# Patient Record
Sex: FEMALE | Race: BLACK OR AFRICAN AMERICAN | NOT HISPANIC OR LATINO | Employment: FULL TIME | URBAN - METROPOLITAN AREA
[De-identification: names, ages, dates, MRNs, and addresses within clinical notes are randomized per-mention and may not be internally consistent; named-entity substitution may affect disease eponyms.]

---

## 2017-01-16 ENCOUNTER — TELEPHONE (OUTPATIENT)
Dept: OBSTETRICS AND GYNECOLOGY | Facility: CLINIC | Age: 52
End: 2017-01-16

## 2017-01-16 NOTE — TELEPHONE ENCOUNTER
----- Message from Jill Evangelista sent at 1/16/2017 11:21 AM CST -----  Contact: self  Pt is calling in to speak with Dr Caceres.  She had a hysterectomy in 2015 and is not gaining weight since the surgery.  She would like to know if it may be a hormone imbalance.    Pt can be reached at [phone]

## 2017-01-17 NOTE — TELEPHONE ENCOUNTER
Spoke to patient and informed her Dr. Caceres would like her to follow up with Dr. Aden. Patient verbalized understanding.

## 2017-01-18 ENCOUNTER — TELEPHONE (OUTPATIENT)
Dept: OBSTETRICS AND GYNECOLOGY | Facility: CLINIC | Age: 52
End: 2017-01-18

## 2017-01-18 NOTE — TELEPHONE ENCOUNTER
----- Message from Desirae Cornell sent at 1/17/2017  4:05 PM CST -----  Contact: Patient  x 1st Request  _ 2nd Request  _ 3rd Request    Who: WALLY LOCK [[id number]]    Why: Patient is calling in regards to her RX for her patches for her hormones. She states the dosage is making her feel ill. Patient is needing a call back from staff.    What Number to Call Back: Patient can be reached at [phone].    When to Expect a call back: (Before the end of the day)  -- if call after 3:00 call back will be tomorrow.

## 2017-03-29 ENCOUNTER — OFFICE VISIT (OUTPATIENT)
Dept: INTERNAL MEDICINE | Facility: CLINIC | Age: 52
End: 2017-03-29
Attending: FAMILY MEDICINE
Payer: COMMERCIAL

## 2017-03-29 VITALS
HEART RATE: 87 BPM | WEIGHT: 237.19 LBS | SYSTOLIC BLOOD PRESSURE: 220 MMHG | DIASTOLIC BLOOD PRESSURE: 122 MMHG | HEIGHT: 67 IN | BODY MASS INDEX: 37.23 KG/M2

## 2017-03-29 DIAGNOSIS — Z90.79 STATUS POST TOTAL ABDOMINAL HYSTERECTOMY AND BILATERAL SALPINGO-OOPHORECTOMY: ICD-10-CM

## 2017-03-29 DIAGNOSIS — Z00.00 PREVENTATIVE HEALTH CARE: Primary | ICD-10-CM

## 2017-03-29 DIAGNOSIS — Z90.722 STATUS POST TOTAL ABDOMINAL HYSTERECTOMY AND BILATERAL SALPINGO-OOPHORECTOMY: ICD-10-CM

## 2017-03-29 DIAGNOSIS — I10 ESSENTIAL HYPERTENSION: ICD-10-CM

## 2017-03-29 DIAGNOSIS — Z90.710 STATUS POST TOTAL ABDOMINAL HYSTERECTOMY AND BILATERAL SALPINGO-OOPHORECTOMY: ICD-10-CM

## 2017-03-29 DIAGNOSIS — D50.0 IRON DEFICIENCY ANEMIA DUE TO CHRONIC BLOOD LOSS: ICD-10-CM

## 2017-03-29 DIAGNOSIS — Z79.890 HORMONE REPLACEMENT THERAPY (HRT): ICD-10-CM

## 2017-03-29 PROCEDURE — 99999 PR PBB SHADOW E&M-EST. PATIENT-LVL III: CPT | Mod: PBBFAC,,, | Performed by: FAMILY MEDICINE

## 2017-03-29 PROCEDURE — 99396 PREV VISIT EST AGE 40-64: CPT | Mod: S$GLB,,, | Performed by: FAMILY MEDICINE

## 2017-03-29 PROCEDURE — 3080F DIAST BP >= 90 MM HG: CPT | Mod: S$GLB,,, | Performed by: FAMILY MEDICINE

## 2017-03-29 PROCEDURE — 3077F SYST BP >= 140 MM HG: CPT | Mod: S$GLB,,, | Performed by: FAMILY MEDICINE

## 2017-03-29 RX ORDER — MELOXICAM 15 MG/1
15 TABLET ORAL DAILY
Qty: 30 TABLET | Refills: 3 | Status: SHIPPED | OUTPATIENT
Start: 2017-03-29 | End: 2018-03-29

## 2017-03-29 RX ORDER — ESTRADIOL 0.1 MG/D
1 PATCH TRANSDERMAL
COMMUNITY
End: 2017-04-27

## 2017-03-29 RX ORDER — LISINOPRIL AND HYDROCHLOROTHIAZIDE 20; 25 MG/1; MG/1
1 TABLET ORAL DAILY
Qty: 30 TABLET | Refills: 11 | Status: SHIPPED | OUTPATIENT
Start: 2017-03-29 | End: 2018-03-29

## 2017-03-29 NOTE — MR AVS SNAPSHOT
[facility]  [address]  [facility] [address]  Phone: [phone]  Fax: [phone]                  Tahira Jenkins   3/29/2017 3:20 PM   Office Visit    Description:  Female : 1965   Provider:  Arthur Smiley MD   Department:  [facility]           Reason for Visit     Annual Exam           Diagnoses this Visit        Comments    Preventative health care    -  Primary     Essential hypertension         Iron deficiency anemia due to chronic blood loss         Status post total abdominal hysterectomy and bilateral salpingo-oophorectomy         Hormone replacement therapy (HRT)                To Do List           Future Appointments        Provider Department Dept Phone    3/30/2017 7:00 AM LAB, SAME DAY [facility] [phone]    2017 3:30 PM Jim Waters NP [facility] [phone]    2017 3:00 PM Lizz Aden MD [facility] [address] [phone]      Goals (5 Years of Data)     None      Follow-Up and Disposition     Return in about 4 weeks (around 2017).       These Medications        Disp Refills Start End    meloxicam (MOBIC) 15 MG tablet 30 tablet 3 3/29/2017     Take 1 tablet (15 mg total) by mouth once daily. - Oral    Pharmacy: [facility] [address] [place] [address] [address] Ph #: [phone]       lisinopril-hydrochlorothiazide (PRINZIDE,ZESTORETIC) 20-25 mg Tab 30 tablet 11 3/29/2017 2017    Take 1 tablet by mouth once daily. - Oral    [facility] [address] [place] [address] [address] Ph #: [phone]         [facility]     [facility] -  Assistance  Registered nurses in the [facility] provide clinical advisement, health education, appointment booking, and other advisory services.  Call for this free service at [phone].             Medications           START  "taking these NEW medications        Refills    meloxicam (MOBIC) 15 MG tablet 3    Sig: Take 1 tablet (15 mg total) by mouth once daily.    Class: Normal    Route: Oral    lisinopril-hydrochlorothiazide (PRINZIDE,ZESTORETIC) 20-25 mg Tab 11    Sig: Take 1 tablet by mouth once daily.    Class: Normal    Route: Oral           Verify that the below list of medications is an accurate representation of the medications you are currently taking.  If none reported, the list may be blank. If incorrect, please contact your healthcare provider. Carry this list with you in case of emergency.           Current Medications     b complex vitamins capsule Take 1 capsule by mouth once daily.    estradiol 0.1 mg/24 hr td ptwk 0.1 mg/24 hr PTWK Place 1 patch onto the skin twice a week.    ferrous gluconate (FERGON) 324 MG tablet Take 324 mg by mouth daily with breakfast.    ferrous sulfate 300 mg (60 mg iron)/5 mL syrup Take 5 mLs (300 mg total) by mouth once daily.    multivitamin capsule Take 1 capsule by mouth once daily.    lisinopril-hydrochlorothiazide (PRINZIDE,ZESTORETIC) 20-25 mg Tab Take 1 tablet by mouth once daily.    meloxicam (MOBIC) 15 MG tablet Take 1 tablet (15 mg total) by mouth once daily.           Clinical Reference Information           Your Vitals Were     BP Pulse Height Weight Last Period BMI    220/122 (BP Location: Left arm, Patient Position: Sitting, BP Method: Automatic) 87 5' 7" (1.702 m) 107.6 kg (237 lb 3.4 oz) 11/23/2015 37.15 kg/m2      Blood Pressure          Most Recent Value    BP  (!)  220/122      Allergies as of 3/29/2017     Vivelle [Estradiol]      Immunizations Administered on Date of Encounter - 3/29/2017     None      Orders Placed During Today's Visit      Normal Orders This Visit    Ambulatory Referral to Gynecology     Future Labs/Procedures Expected by Expires    CBC auto differential  3/29/2017 6/27/2017    Comprehensive metabolic panel  3/29/2017 5/28/2017    Hemoglobin A1c  3/29/2017 " 6/27/2017    Lipid panel  3/29/2017 5/28/2017    TSH  3/29/2017 6/27/2017      Language Assistance Services     ATTENTION: Language assistance services are available, free of charge. Please call [phone].      ATENCIÓN: Si habla walter, tiene a morales disposición servicios gratuitos de asistencia lingüística. Llame al [phone].     CHÚ Ý: N?u b?n nói Ti?ng Vi?t, có các d?ch v? h? tr? ngôn ng? mi?n phí dành cho b?n. G?i s? [phone].         [facility] complies with applicable Federal civil rights laws and does not discriminate on the basis of race, color, national origin, age, disability, or sex.

## 2017-03-30 ENCOUNTER — LAB VISIT (OUTPATIENT)
Dept: LAB | Facility: OTHER | Age: 52
End: 2017-03-30
Attending: FAMILY MEDICINE
Payer: COMMERCIAL

## 2017-03-30 DIAGNOSIS — I10 ESSENTIAL HYPERTENSION: ICD-10-CM

## 2017-03-30 DIAGNOSIS — Z00.00 PREVENTATIVE HEALTH CARE: ICD-10-CM

## 2017-03-30 LAB
ALBUMIN SERPL BCP-MCNC: 3.9 G/DL
ALP SERPL-CCNC: 101 U/L
ALT SERPL W/O P-5'-P-CCNC: 16 U/L
ANION GAP SERPL CALC-SCNC: 12 MMOL/L
AST SERPL-CCNC: 17 U/L
BASOPHILS # BLD AUTO: 0.01 K/UL
BASOPHILS NFR BLD: 0.2 %
BILIRUB SERPL-MCNC: 0.5 MG/DL
BUN SERPL-MCNC: 12 MG/DL
CALCIUM SERPL-MCNC: 10.1 MG/DL
CHLORIDE SERPL-SCNC: 102 MMOL/L
CHOLEST/HDLC SERPL: 2.4 {RATIO}
CO2 SERPL-SCNC: 26 MMOL/L
CREAT SERPL-MCNC: 0.9 MG/DL
DIFFERENTIAL METHOD: ABNORMAL
EOSINOPHIL # BLD AUTO: 0.2 K/UL
EOSINOPHIL NFR BLD: 2.9 %
ERYTHROCYTE [DISTWIDTH] IN BLOOD BY AUTOMATED COUNT: 14.6 %
EST. GFR  (AFRICAN AMERICAN): >60 ML/MIN/1.73 M^2
EST. GFR  (NON AFRICAN AMERICAN): >60 ML/MIN/1.73 M^2
GLUCOSE SERPL-MCNC: 97 MG/DL
HCT VFR BLD AUTO: 47 %
HDL/CHOLESTEROL RATIO: 41.5 %
HDLC SERPL-MCNC: 159 MG/DL
HDLC SERPL-MCNC: 66 MG/DL
HGB BLD-MCNC: 15 G/DL
LDLC SERPL CALC-MCNC: 68.8 MG/DL
LYMPHOCYTES # BLD AUTO: 1.7 K/UL
LYMPHOCYTES NFR BLD: 25.8 %
MCH RBC QN AUTO: 25.7 PG
MCHC RBC AUTO-ENTMCNC: 31.9 %
MCV RBC AUTO: 81 FL
MONOCYTES # BLD AUTO: 0.5 K/UL
MONOCYTES NFR BLD: 7.7 %
NEUTROPHILS # BLD AUTO: 4.2 K/UL
NEUTROPHILS NFR BLD: 63.2 %
NONHDLC SERPL-MCNC: 93 MG/DL
PLATELET # BLD AUTO: 269 K/UL
PMV BLD AUTO: 9.6 FL
POTASSIUM SERPL-SCNC: 3.6 MMOL/L
PROT SERPL-MCNC: 7.6 G/DL
RBC # BLD AUTO: 5.83 M/UL
SODIUM SERPL-SCNC: 140 MMOL/L
TRIGL SERPL-MCNC: 121 MG/DL
TSH SERPL DL<=0.005 MIU/L-ACNC: 1.18 UIU/ML
WBC # BLD AUTO: 6.62 K/UL

## 2017-03-30 PROCEDURE — 80061 LIPID PANEL: CPT

## 2017-03-30 PROCEDURE — 36415 COLL VENOUS BLD VENIPUNCTURE: CPT

## 2017-03-30 PROCEDURE — 85025 COMPLETE CBC W/AUTO DIFF WBC: CPT

## 2017-03-30 PROCEDURE — 80053 COMPREHEN METABOLIC PANEL: CPT

## 2017-03-30 PROCEDURE — 84443 ASSAY THYROID STIM HORMONE: CPT

## 2017-03-30 PROCEDURE — 83036 HEMOGLOBIN GLYCOSYLATED A1C: CPT

## 2017-03-30 NOTE — PROGRESS NOTES
"CHIEF COMPLAINT: Annual exam    HISTORY OF PRESENT ILLNESS: The patient is a remarkably healthy 51-year-old black female.  The patient has not been seen in a while.  Her weight is up a good bit.  Her blood pressure is up a lot.  We will begin therapy for her blood pressure and have her see the nurse practitioner in one month.  I will also have her check in with her gynecologist regarding the hormone replacement therapy.  She is encouraged to begin lifestyle modification to help with both weight gain and hypertension    REVIEW OF SYSTEMS:  GENERAL: No fever, chills, fatigability or weight loss.  SKIN: No rashes, itching or changes in color or texture of skin.  HEAD: No headaches or recent head trauma.  EYES: Visual acuity fine. No photophobia, ocular pain or diplopia.  EARS: Denies ear pain, discharge or vertigo.  NOSE: No loss of smell, no epistaxis or postnasal drip.  MOUTH & THROAT: No hoarseness or change in voice. No excessive gum bleeding.  NODES: Denies swollen glands.  CHEST: Denies COSTA, cyanosis, wheezing, cough and sputum production.  CARDIOVASCULAR: Denies chest pain, PND, orthopnea or reduced exercise tolerance.  ABDOMEN: Appetite fine. No weight loss. Denies diarrhea, abdominal pain, hematemesis or blood in stool.  URINARY: No flank pain, dysuria or hematuria.  PERIPHERAL VASCULAR: No claudication or cyanosis.  MUSCULOSKELETAL: No joint stiffness or swelling. Denies back pain.  NEUROLOGIC: No history of seizures, paralysis, alteration of gait or coordination.    SOCIAL HISTORY: The patient does not smoke.  The patient consumes alcohol socially.  The patient works at [employer].    PHYSICAL EXAMINATION:     Blood pressure (!) 220/122, pulse 87, height 5' 7" (1.702 m), weight 107.6 kg (237 lb 3.4 oz), last menstrual period 11/23/2015.    APPEARANCE: Well nourished, well developed, in no acute distress.    HEAD: Normocephalic, atraumatic.  EYES: PERRL. EOMI.  Conjunctivae without injection and  " anicteric  EARS: TM's intact. Light reflex normal. No retraction or perforation.    NOSE: Mucosa pink. Airway clear.  MOUTH & THROAT: No tonsillar enlargement. No pharyngeal erythema or exudate. No stridor.  NECK: Supple.   NODES: No cervical, axillary or inguinal lymph node enlargement.  CHEST: Lungs clear to auscultation.  No retractions are noted.  No rales or rhonchi are present.  CARDIOVASCULAR: Normal S1, S2. No rubs, murmurs or gallops.  ABDOMEN: Bowel sounds normal. Not distended. Soft. No tenderness or masses.  No ascites is noted.  MUSCULOSKELETAL:  There is no clubbing, cyanosis, or edema of the extremities x4.  There is full range of motion of the lumbar spine.  There is full range of motion of the extremities x4.  There is no deformity noted.    NEUROLOGIC:       Normal speech development.      Hearing normal.      Normal gait.      Motor and sensory exams grossly normal.      DTR's normal.  PSYCHIATRIC: Patient is alert and oriented x3.  Thought processes are all normal.  There is no homicidality.  There is no suicidality.  There is no evidence of psychosis.    LABORATORY/RADIOLOGY:   Chart reviewed.  We will update blood work today.    ASSESSMENT:   Normal exam  Hypertension, poorly controlled  Significant weight gain  Hormone replacement therapy status post hysterectomy and oophorectomy  Fe def anemia, resolved    PLAN:  We will have the patient see GYN regarding the HRT  Prinzide  Lifestyle modification  We will follow-up blood work which we expect to be normal.    Return to clinic in one month with nurse practitioner for blood pressure management

## 2017-03-31 LAB
ESTIMATED AVG GLUCOSE: 85 MG/DL
HBA1C MFR BLD HPLC: 4.6 %

## 2017-04-27 ENCOUNTER — OFFICE VISIT (OUTPATIENT)
Dept: INTERNAL MEDICINE | Facility: CLINIC | Age: 52
End: 2017-04-27
Payer: COMMERCIAL

## 2017-04-27 VITALS
BODY MASS INDEX: 36.75 KG/M2 | DIASTOLIC BLOOD PRESSURE: 92 MMHG | SYSTOLIC BLOOD PRESSURE: 160 MMHG | HEART RATE: 88 BPM | HEIGHT: 67 IN | WEIGHT: 234.13 LBS

## 2017-04-27 DIAGNOSIS — I10 ESSENTIAL HYPERTENSION: Primary | ICD-10-CM

## 2017-04-27 PROCEDURE — 99213 OFFICE O/P EST LOW 20 MIN: CPT | Mod: S$GLB,,, | Performed by: NURSE PRACTITIONER

## 2017-04-27 PROCEDURE — 3080F DIAST BP >= 90 MM HG: CPT | Mod: S$GLB,,, | Performed by: NURSE PRACTITIONER

## 2017-04-27 PROCEDURE — 99999 PR PBB SHADOW E&M-EST. PATIENT-LVL III: CPT | Mod: PBBFAC,,, | Performed by: NURSE PRACTITIONER

## 2017-04-27 PROCEDURE — 1160F RVW MEDS BY RX/DR IN RCRD: CPT | Mod: S$GLB,,, | Performed by: NURSE PRACTITIONER

## 2017-04-27 PROCEDURE — 3077F SYST BP >= 140 MM HG: CPT | Mod: S$GLB,,, | Performed by: NURSE PRACTITIONER

## 2017-04-27 RX ORDER — AMLODIPINE BESYLATE 5 MG/1
5 TABLET ORAL DAILY
Qty: 30 TABLET | Refills: 0 | Status: SHIPPED | OUTPATIENT
Start: 2017-04-27 | End: 2017-05-25 | Stop reason: SDUPTHER

## 2017-04-27 NOTE — PROGRESS NOTES
Subjective:       Patient ID: Tahira Jenkins is a 51 y.o. female.    Chief Complaint: Hypertension    HPI Comments: Tahira Jenkins seen in follow-up for BP management.     Pt's BP is not well controlled but improving. Currently taking lisinopril/hctz 20/25 mg.  Tolerating meds well. Pt denies cp/sob/ha/vision or neuro changes. Not checking at home. Reports that BLE swelling has resolved since starting BP med.      Of note, pt seeing Dr. Aden 05/2 re: HRT.     Hypertension   This is a chronic problem. The problem is uncontrolled. Pertinent negatives include no anxiety, blurred vision, chest pain, headaches, malaise/fatigue, palpitations, peripheral edema or shortness of breath.     Review of Systems   Constitutional: Negative for activity change and malaise/fatigue.   HENT: Negative for facial swelling.    Eyes: Negative for blurred vision and photophobia.   Respiratory: Negative for chest tightness and shortness of breath.    Cardiovascular: Negative for chest pain, palpitations and leg swelling.   Neurological: Negative for dizziness, weakness and headaches.   Psychiatric/Behavioral: Negative for dysphoric mood.       Objective:      Physical Exam   Constitutional: She is oriented to person, place, and time. She appears well-developed and well-nourished.   HENT:   Head: Normocephalic and atraumatic.   Eyes: EOM are normal. Pupils are equal, round, and reactive to light.   Neck: Normal range of motion. Neck supple.   Cardiovascular: Normal rate, regular rhythm, normal heart sounds and intact distal pulses.    Pulmonary/Chest: Effort normal and breath sounds normal.   Musculoskeletal: Normal range of motion.   Neurological: She is alert and oriented to person, place, and time.   Skin: Skin is warm and dry.   Psychiatric: Her behavior is normal.   Vitals reviewed.      Assessment:       1. Essential hypertension        Plan:       -Start checking BP at home. Counseled on timing and technique. Keep log.    -Start amlodipine 5 mg daily. Side effects and proper use d/w pt. Continue lisinopril/HCTZ 20/25 mg daily.   -f/u with NP in 4 weeks for BP management  -f/u with Dr. Smiley in 6 months.

## 2017-05-02 ENCOUNTER — CLINICAL SUPPORT (OUTPATIENT)
Dept: OBSTETRICS AND GYNECOLOGY | Facility: CLINIC | Age: 52
End: 2017-05-02
Payer: COMMERCIAL

## 2017-05-02 ENCOUNTER — LAB VISIT (OUTPATIENT)
Dept: LAB | Facility: OTHER | Age: 52
End: 2017-05-02
Attending: OBSTETRICS & GYNECOLOGY
Payer: COMMERCIAL

## 2017-05-02 ENCOUNTER — OFFICE VISIT (OUTPATIENT)
Dept: OBSTETRICS AND GYNECOLOGY | Facility: CLINIC | Age: 52
End: 2017-05-02
Attending: OBSTETRICS & GYNECOLOGY
Payer: COMMERCIAL

## 2017-05-02 VITALS
WEIGHT: 226.19 LBS | SYSTOLIC BLOOD PRESSURE: 146 MMHG | DIASTOLIC BLOOD PRESSURE: 94 MMHG | BODY MASS INDEX: 35.5 KG/M2 | HEIGHT: 67 IN

## 2017-05-02 DIAGNOSIS — Z78.0 MENOPAUSE: ICD-10-CM

## 2017-05-02 DIAGNOSIS — Z12.31 VISIT FOR SCREENING MAMMOGRAM: Primary | ICD-10-CM

## 2017-05-02 DIAGNOSIS — R53.83 LOW ENERGY: ICD-10-CM

## 2017-05-02 DIAGNOSIS — R53.83 LOW ENERGY: Primary | ICD-10-CM

## 2017-05-02 LAB — ESTRADIOL SERPL-MCNC: 16 PG/ML

## 2017-05-02 PROCEDURE — 3077F SYST BP >= 140 MM HG: CPT | Mod: S$GLB,,, | Performed by: OBSTETRICS & GYNECOLOGY

## 2017-05-02 PROCEDURE — 99999 PR PBB SHADOW E&M-EST. PATIENT-LVL II: CPT | Mod: PBBFAC,,,

## 2017-05-02 PROCEDURE — 3080F DIAST BP >= 90 MM HG: CPT | Mod: S$GLB,,, | Performed by: OBSTETRICS & GYNECOLOGY

## 2017-05-02 PROCEDURE — 99214 OFFICE O/P EST MOD 30 MIN: CPT | Mod: 25,S$GLB,, | Performed by: OBSTETRICS & GYNECOLOGY

## 2017-05-02 PROCEDURE — 1160F RVW MEDS BY RX/DR IN RCRD: CPT | Mod: S$GLB,,, | Performed by: OBSTETRICS & GYNECOLOGY

## 2017-05-02 PROCEDURE — 99999 PR PBB SHADOW E&M-EST. PATIENT-LVL II: CPT | Mod: PBBFAC,,, | Performed by: OBSTETRICS & GYNECOLOGY

## 2017-05-02 PROCEDURE — 96372 THER/PROPH/DIAG INJ SC/IM: CPT | Mod: S$GLB,,, | Performed by: OBSTETRICS & GYNECOLOGY

## 2017-05-02 RX ORDER — ESTRADIOL 0.05 MG/D
1 FILM, EXTENDED RELEASE TRANSDERMAL WEEKLY
Qty: 12 PATCH | Refills: 3 | Status: SHIPPED | OUTPATIENT
Start: 2017-05-02 | End: 2017-06-19 | Stop reason: SDUPTHER

## 2017-05-02 RX ORDER — TESTOSTERONE CYPIONATE 200 MG/ML
50 INJECTION, SOLUTION INTRAMUSCULAR ONCE
Status: COMPLETED | OUTPATIENT
Start: 2017-05-02 | End: 2017-05-02

## 2017-05-02 RX ADMIN — TESTOSTERONE CYPIONATE 50 MG: 200 INJECTION, SOLUTION INTRAMUSCULAR at 04:05

## 2017-05-02 NOTE — PROGRESS NOTES
Here for  hormone therapy injection, no complaints at this time , Injection given as ordered, tolerated well, no report of pain prior to or after injection. Return to clinic as scheduled.     Site - LB    Testosterone 50 mg      Clinic Supplied Medication

## 2017-05-02 NOTE — PROGRESS NOTES
Subjective:       Patient ID: Tahira Jenkins is a 51 y.o. female.    Chief Complaint:  Weight Gain      History of Present Illness  HPI  This 51 yr old P3 female is here for follow up on HRT.  She had hyst /BSO last yr for fibroids and irreg bleeding.   She had started on the patch and stopped due to bleeding gums and itching.  She however did restart on this in Dec and not having any side effects and her hot flashes were gone while she was taking it.  Her internist had her stop the patches until she saw me today.   She is taking hypertension pills x2 now.  She has gained about 60 to 70 lbs since     GYN & OB History  Patient's last menstrual period was 2015.   Date of Last Pap: 2015    OB History    Para Term  AB SAB TAB Ectopic Multiple Living   4 3 3  1 1          # Outcome Date GA Lbr Abiel/2nd Weight Sex Delivery Anes PTL Lv   4 Term            3 Term            2 Term            1 SAB                   Review of Systems  Review of Systems   Constitutional: Negative for chills and fever.   Respiratory: Negative for shortness of breath.    Cardiovascular: Negative for chest pain.   Gastrointestinal: Negative for abdominal pain, nausea and vomiting.   Endocrine: Positive for heat intolerance.   Genitourinary: Negative for difficulty urinating, dyspareunia, genital sores, menstrual problem, pelvic pain, vaginal bleeding, vaginal discharge and vaginal pain.   Skin: Negative for wound.   Hematological: Negative for adenopathy.   Psychiatric/Behavioral: Positive for sleep disturbance.           Objective:    Physical Exam       Assessment:        1. Visit for screening mammogram               Plan:      Will restart on HRT and follow up in few months.  Pt understands and agrees.  She is obviously not allergic to vivelle.

## 2017-05-04 ENCOUNTER — TELEPHONE (OUTPATIENT)
Dept: OBSTETRICS AND GYNECOLOGY | Facility: CLINIC | Age: 52
End: 2017-05-04

## 2017-05-04 LAB — TESTOST FREE SERPL-MCNC: 1.4 PG/ML

## 2017-05-18 ENCOUNTER — TELEPHONE (OUTPATIENT)
Dept: OBSTETRICS AND GYNECOLOGY | Facility: CLINIC | Age: 52
End: 2017-05-18

## 2017-05-18 NOTE — TELEPHONE ENCOUNTER
----- Message from Desirae Cornell sent at 5/18/2017  8:48 AM CDT -----  Contact: Patient  x_ 1st Request  _ 2nd Request  _ 3rd Request    Who: WALLY LOCK [[id number]]    Why: Patient is calling in regards to scheduling a appointment. Patient is needing a call back.    What Number to Call Back: Patient can be reached at [phone].    When to Expect a call back: (Before the end of the day)  -- if call after 3:00 call back will be tomorrow.

## 2017-05-25 ENCOUNTER — OFFICE VISIT (OUTPATIENT)
Dept: INTERNAL MEDICINE | Facility: CLINIC | Age: 52
End: 2017-05-25
Payer: COMMERCIAL

## 2017-05-25 VITALS
HEIGHT: 67 IN | HEART RATE: 79 BPM | BODY MASS INDEX: 36.2 KG/M2 | OXYGEN SATURATION: 97 % | WEIGHT: 230.63 LBS | SYSTOLIC BLOOD PRESSURE: 144 MMHG | DIASTOLIC BLOOD PRESSURE: 84 MMHG

## 2017-05-25 DIAGNOSIS — I10 ESSENTIAL HYPERTENSION: ICD-10-CM

## 2017-05-25 PROCEDURE — 99999 PR PBB SHADOW E&M-EST. PATIENT-LVL III: CPT | Mod: PBBFAC,,, | Performed by: NURSE PRACTITIONER

## 2017-05-25 PROCEDURE — 99213 OFFICE O/P EST LOW 20 MIN: CPT | Mod: S$GLB,,, | Performed by: NURSE PRACTITIONER

## 2017-05-25 NOTE — PROGRESS NOTES
Subjective:       Patient ID: Tahira Jenkins is a 51 y.o. female.    Chief Complaint: Hypertension    Tahira Jenkins seen in follow-up for BP management after starting amlodipine 5 mg.      Pt's BP is not well controlled. Currently taking amlodipine 5 mg daily and lisinopril/HCTZ 20/25 mg. Tolerating meds well. Pt denies cp/sob/ha/vision or neuro changes. Not checking at home.      Hypertension   This is a chronic problem. The problem has been gradually improving since onset. Pertinent negatives include no anxiety, blurred vision, chest pain, headaches, malaise/fatigue, neck pain, palpitations, peripheral edema or shortness of breath.     Review of Systems   Constitutional: Negative for malaise/fatigue.   HENT: Negative for facial swelling.    Eyes: Negative for blurred vision and photophobia.   Respiratory: Negative for chest tightness and shortness of breath.    Cardiovascular: Negative for chest pain, palpitations and leg swelling.   Musculoskeletal: Negative for neck pain.   Neurological: Negative for dizziness, seizures, facial asymmetry, weakness and headaches.   Psychiatric/Behavioral: Negative for dysphoric mood.       Objective:      Physical Exam   Constitutional: She is oriented to person, place, and time. She appears well-developed and well-nourished.   Eyes: EOM are normal. Pupils are equal, round, and reactive to light.   Neck: Normal range of motion. Neck supple.   Cardiovascular: Normal rate, regular rhythm, normal heart sounds and intact distal pulses.    Pulmonary/Chest: Effort normal and breath sounds normal.   Musculoskeletal: Normal range of motion.   Neurological: She is alert and oriented to person, place, and time.   Skin: Skin is warm and dry.   Psychiatric: Her behavior is normal.   Vitals reviewed.      Assessment:       1. Essential hypertension        Plan:       -Get BP cuff and start BP checks at home. D/w pt timing and technique.   -Increase amlodipine to 10 mg  -f/u with  NP in 4 weeks for BP management  -f/u with Dr. Smiley in October

## 2017-06-02 ENCOUNTER — CLINICAL SUPPORT (OUTPATIENT)
Dept: OBSTETRICS AND GYNECOLOGY | Facility: CLINIC | Age: 52
End: 2017-06-02
Payer: COMMERCIAL

## 2017-06-02 DIAGNOSIS — R68.82 LIBIDO, DECREASED: Primary | ICD-10-CM

## 2017-06-02 PROCEDURE — 99999 PR PBB SHADOW E&M-EST. PATIENT-LVL II: CPT | Mod: PBBFAC,,,

## 2017-06-02 PROCEDURE — 96372 THER/PROPH/DIAG INJ SC/IM: CPT | Mod: S$GLB,,, | Performed by: OBSTETRICS & GYNECOLOGY

## 2017-06-02 RX ADMIN — TESTOSTERONE CYPIONATE 50 MG: 200 INJECTION, SOLUTION INTRAMUSCULAR at 09:06

## 2017-06-05 RX ORDER — TESTOSTERONE CYPIONATE 200 MG/ML
50 INJECTION, SOLUTION INTRAMUSCULAR
Status: COMPLETED | OUTPATIENT
Start: 2017-06-05 | End: 2017-06-02

## 2017-06-05 NOTE — PROGRESS NOTES
Here for  hormone therapy injection, no complaints at this time , Injection given as ordered, tolerated well, no report of pain prior to or after injection. Return to clinic as scheduled.     Site -  RB    Testosterone  50  mg    Clinic Supplied Medication

## 2017-06-19 DIAGNOSIS — Z78.0 MENOPAUSE: ICD-10-CM

## 2017-06-19 NOTE — TELEPHONE ENCOUNTER
----- Message from Elder Woods sent at 6/19/2017 11:21 AM CDT -----  Contact: Pt  X_ 1st Request  _ 2nd Request  _ 3rd Request    Who: WALLY LOCK [[id number]]    Why: Patient would like to speak with staff in regards to getting a refill on her estradiol 0.05 mg/24 hr td ptsw (VIVELLE-DOT) 0.05 mg/24 hr [facility] - [place], [place] - [facility] [address]    What Number to Call Back: [phone]    When to Expect a call back: (Before the end of the day)  -- if call after 3:00 call back will be tomorrow.

## 2017-06-20 RX ORDER — ESTRADIOL 0.05 MG/D
1 FILM, EXTENDED RELEASE TRANSDERMAL WEEKLY
Qty: 12 PATCH | Refills: 3 | Status: SHIPPED | OUTPATIENT
Start: 2017-06-20 | End: 2018-12-23 | Stop reason: SDUPTHER

## 2017-06-22 ENCOUNTER — OFFICE VISIT (OUTPATIENT)
Dept: INTERNAL MEDICINE | Facility: CLINIC | Age: 52
End: 2017-06-22
Payer: COMMERCIAL

## 2017-06-22 VITALS
DIASTOLIC BLOOD PRESSURE: 78 MMHG | HEIGHT: 67 IN | HEART RATE: 94 BPM | WEIGHT: 233 LBS | OXYGEN SATURATION: 97 % | BODY MASS INDEX: 36.57 KG/M2 | SYSTOLIC BLOOD PRESSURE: 122 MMHG

## 2017-06-22 DIAGNOSIS — Z23 NEED FOR DIPHTHERIA-TETANUS-PERTUSSIS (TDAP) VACCINE: ICD-10-CM

## 2017-06-22 DIAGNOSIS — I10 ESSENTIAL HYPERTENSION: Primary | ICD-10-CM

## 2017-06-22 PROCEDURE — 99999 PR PBB SHADOW E&M-EST. PATIENT-LVL III: CPT | Mod: PBBFAC,,, | Performed by: NURSE PRACTITIONER

## 2017-06-22 PROCEDURE — 90715 TDAP VACCINE 7 YRS/> IM: CPT | Mod: S$GLB,,, | Performed by: NURSE PRACTITIONER

## 2017-06-22 PROCEDURE — 90471 IMMUNIZATION ADMIN: CPT | Mod: S$GLB,,, | Performed by: NURSE PRACTITIONER

## 2017-06-22 PROCEDURE — 99213 OFFICE O/P EST LOW 20 MIN: CPT | Mod: 25,S$GLB,, | Performed by: NURSE PRACTITIONER

## 2017-06-22 RX ORDER — AMLODIPINE BESYLATE 10 MG/1
10 TABLET ORAL DAILY
Qty: 90 TABLET | Refills: 3 | Status: SHIPPED | OUTPATIENT
Start: 2017-06-22 | End: 2018-07-10 | Stop reason: SDUPTHER

## 2017-06-22 NOTE — PROGRESS NOTES
Patient given Tdap IM in the RD. Patient tolerated well and Band-Aid was applied. Lot#[id number] Exp:04/05/2019. Patient advised to wait in the lobby for 15 min to make sure no adverse reactions occur. Patient states verbal understanding and has no further questions.  Pt has been given vaccine information sheet.

## 2017-06-22 NOTE — PROGRESS NOTES
Subjective:       Patient ID: Tahira Jenkins is a 51 y.o. female.    Chief Complaint: Hypertension    Tahira Jenkins seen in follow-up for BP management.     Pt's BP is well controlled. Currently taking amlodipine 10 mg daily and lisinopril/HCTZ 20/25 mg daily. Tolerating meds well. Pt denies cp/sob/ha/vision or neuro changes.       Review of Systems   Constitutional: Negative for unexpected weight change.   HENT: Negative for facial swelling.    Eyes: Negative for photophobia.   Respiratory: Negative for chest tightness and shortness of breath.    Cardiovascular: Negative for chest pain, palpitations and leg swelling.   Neurological: Negative for dizziness, weakness, light-headedness and headaches.   Psychiatric/Behavioral: Negative for dysphoric mood.       Objective:      Physical Exam   Constitutional: She is oriented to person, place, and time. She appears well-developed and well-nourished.   HENT:   Head: Normocephalic and atraumatic.   Eyes: EOM are normal. Pupils are equal, round, and reactive to light.   Neck: Normal range of motion. Neck supple.   Cardiovascular: Normal rate, regular rhythm, normal heart sounds and intact distal pulses.    Pulmonary/Chest: Effort normal and breath sounds normal.   Musculoskeletal: Normal range of motion.   Neurological: She is alert and oriented to person, place, and time.   Skin: Skin is warm and dry.   Psychiatric: Her behavior is normal.       Assessment:       1. Essential hypertension    2. Need for diphtheria-tetanus-pertussis (Tdap) vaccine        Plan:       -No changes to BP meds.  -Tdap vaccine given today   -F/U with Dr. Smiley in October 2017

## 2017-07-03 ENCOUNTER — CLINICAL SUPPORT (OUTPATIENT)
Dept: OBSTETRICS AND GYNECOLOGY | Facility: CLINIC | Age: 52
End: 2017-07-03
Payer: COMMERCIAL

## 2017-07-03 DIAGNOSIS — R68.82 LIBIDO, DECREASED: Primary | ICD-10-CM

## 2017-07-03 PROCEDURE — 99999 PR PBB SHADOW E&M-EST. PATIENT-LVL II: CPT | Mod: PBBFAC,,,

## 2017-07-03 PROCEDURE — 96372 THER/PROPH/DIAG INJ SC/IM: CPT | Mod: S$GLB,,, | Performed by: OBSTETRICS & GYNECOLOGY

## 2017-07-03 RX ORDER — TESTOSTERONE CYPIONATE 200 MG/ML
50 INJECTION, SOLUTION INTRAMUSCULAR
Status: COMPLETED | OUTPATIENT
Start: 2017-07-03 | End: 2017-08-11

## 2017-07-03 RX ADMIN — TESTOSTERONE CYPIONATE 50 MG: 200 INJECTION, SOLUTION INTRAMUSCULAR at 04:07

## 2017-08-11 ENCOUNTER — CLINICAL SUPPORT (OUTPATIENT)
Dept: OBSTETRICS AND GYNECOLOGY | Facility: CLINIC | Age: 52
End: 2017-08-11
Payer: COMMERCIAL

## 2017-08-11 DIAGNOSIS — Z12.11 COLON CANCER SCREENING: ICD-10-CM

## 2017-08-11 DIAGNOSIS — Z79.890 HORMONE REPLACEMENT THERAPY (HRT): Primary | ICD-10-CM

## 2017-08-11 PROCEDURE — 99999 PR PBB SHADOW E&M-EST. PATIENT-LVL II: CPT | Mod: PBBFAC,,,

## 2017-08-11 PROCEDURE — 96372 THER/PROPH/DIAG INJ SC/IM: CPT | Mod: S$GLB,,, | Performed by: OBSTETRICS & GYNECOLOGY

## 2017-08-11 RX ADMIN — TESTOSTERONE CYPIONATE 50 MG: 200 INJECTION, SOLUTION INTRAMUSCULAR at 10:08

## 2017-08-11 NOTE — PROGRESS NOTES
Here for  hormone therapy injection, no complaints at this time , Injection given as ordered, tolerated well, no report of pain prior to or after injection. Return to clinic as scheduled.     Site - LB    Testosterone  50  mg      Clinic Supplied Medication      Labs will be done on Aug 25

## 2017-08-25 ENCOUNTER — LAB VISIT (OUTPATIENT)
Dept: LAB | Facility: OTHER | Age: 52
End: 2017-08-25
Attending: OBSTETRICS & GYNECOLOGY
Payer: COMMERCIAL

## 2017-08-25 DIAGNOSIS — Z79.890 HORMONE REPLACEMENT THERAPY (HRT): ICD-10-CM

## 2017-08-25 LAB — ESTRADIOL SERPL-MCNC: 10 PG/ML

## 2017-08-25 PROCEDURE — 36415 COLL VENOUS BLD VENIPUNCTURE: CPT

## 2017-08-25 PROCEDURE — 82670 ASSAY OF TOTAL ESTRADIOL: CPT

## 2017-08-25 PROCEDURE — 84402 ASSAY OF FREE TESTOSTERONE: CPT

## 2017-08-28 LAB — TESTOST FREE SERPL-MCNC: 3 PG/ML

## 2017-08-30 ENCOUNTER — PATIENT MESSAGE (OUTPATIENT)
Dept: OBSTETRICS AND GYNECOLOGY | Facility: CLINIC | Age: 52
End: 2017-08-30

## 2017-09-05 ENCOUNTER — CLINICAL SUPPORT (OUTPATIENT)
Dept: OBSTETRICS AND GYNECOLOGY | Facility: CLINIC | Age: 52
End: 2017-09-05
Payer: COMMERCIAL

## 2017-09-05 DIAGNOSIS — Z79.890 HORMONE REPLACEMENT THERAPY (HRT): Primary | ICD-10-CM

## 2017-09-05 PROCEDURE — 99999 PR PBB SHADOW E&M-EST. PATIENT-LVL II: CPT | Mod: PBBFAC,,,

## 2017-09-05 PROCEDURE — 96372 THER/PROPH/DIAG INJ SC/IM: CPT | Mod: S$GLB,,, | Performed by: OBSTETRICS & GYNECOLOGY

## 2017-09-05 RX ORDER — TESTOSTERONE CYPIONATE 200 MG/ML
50 INJECTION, SOLUTION INTRAMUSCULAR
Status: COMPLETED | OUTPATIENT
Start: 2017-09-05 | End: 2017-10-12

## 2017-09-05 RX ADMIN — TESTOSTERONE CYPIONATE 50 MG: 200 INJECTION, SOLUTION INTRAMUSCULAR at 01:09

## 2017-10-12 ENCOUNTER — CLINICAL SUPPORT (OUTPATIENT)
Dept: OBSTETRICS AND GYNECOLOGY | Facility: CLINIC | Age: 52
End: 2017-10-12
Payer: COMMERCIAL

## 2017-10-12 PROCEDURE — 96372 THER/PROPH/DIAG INJ SC/IM: CPT | Mod: S$GLB,,, | Performed by: OBSTETRICS & GYNECOLOGY

## 2017-10-12 PROCEDURE — 99999 PR PBB SHADOW E&M-EST. PATIENT-LVL II: CPT | Mod: PBBFAC,,,

## 2017-10-12 RX ADMIN — TESTOSTERONE CYPIONATE 50 MG: 200 INJECTION, SOLUTION INTRAMUSCULAR at 01:10

## 2017-11-24 ENCOUNTER — OFFICE VISIT (OUTPATIENT)
Dept: INTERNAL MEDICINE | Facility: CLINIC | Age: 52
End: 2017-11-24
Attending: FAMILY MEDICINE
Payer: COMMERCIAL

## 2017-11-24 VITALS
DIASTOLIC BLOOD PRESSURE: 80 MMHG | HEIGHT: 67 IN | BODY MASS INDEX: 35.82 KG/M2 | HEART RATE: 86 BPM | SYSTOLIC BLOOD PRESSURE: 120 MMHG | WEIGHT: 228.19 LBS

## 2017-11-24 DIAGNOSIS — Z12.11 SCREEN FOR COLON CANCER: ICD-10-CM

## 2017-11-24 DIAGNOSIS — M79.671 INTRACTABLE RIGHT HEEL PAIN: Primary | ICD-10-CM

## 2017-11-24 DIAGNOSIS — Z12.31 SCREENING MAMMOGRAM, ENCOUNTER FOR: ICD-10-CM

## 2017-11-24 DIAGNOSIS — I10 ESSENTIAL HYPERTENSION: ICD-10-CM

## 2017-11-24 PROCEDURE — 99999 PR PBB SHADOW E&M-EST. PATIENT-LVL IV: CPT | Mod: PBBFAC,,, | Performed by: FAMILY MEDICINE

## 2017-11-24 PROCEDURE — 99214 OFFICE O/P EST MOD 30 MIN: CPT | Mod: S$GLB,,, | Performed by: FAMILY MEDICINE

## 2017-11-24 NOTE — PROGRESS NOTES
"CHIEF COMPLAINT: Right heel pain and HTN    HISTORY OF PRESENT ILLNESS: The patient is a remarkably healthy 52-year-old black female.  The patient has been having right heel pain for several months.  There was no trauma at the onset of the pain.  Over-the-counter medications are not helping.  It doesn't really get much better throughout the day.  It sounds like a heel spur and probably some plantar fasciitis as well.    The patient has a history of stable hypertension on current medications.  Patient denies chest pain or shortness of breath today.    REVIEW OF SYSTEMS:  GENERAL: No fever, chills, fatigability or weight loss.  SKIN: No rashes, itching or changes in color or texture of skin.  HEAD: No headaches or recent head trauma.  EYES: Visual acuity fine. No photophobia, ocular pain or diplopia.  EARS: Denies ear pain, discharge or vertigo.  NOSE: No loss of smell, no epistaxis or postnasal drip.  MOUTH & THROAT: No hoarseness or change in voice. No excessive gum bleeding.  NODES: Denies swollen glands.  CHEST: Denies COSTA, cyanosis, wheezing, cough and sputum production.  CARDIOVASCULAR: Denies chest pain, PND, orthopnea or reduced exercise tolerance.  ABDOMEN: Appetite fine. No weight loss. Denies diarrhea, abdominal pain, hematemesis or blood in stool.  URINARY: No flank pain, dysuria or hematuria.  PERIPHERAL VASCULAR: No claudication or cyanosis.  MUSCULOSKELETAL: No joint stiffness or swelling. Denies back pain.  Except as above  NEUROLOGIC: No history of seizures, paralysis, alteration of gait or coordination.    SOCIAL HISTORY: The patient does not smoke.  The patient consumes alcohol socially.  The patient works at [employer].    PHYSICAL EXAMINATION:     Blood pressure 120/80, pulse 86, height 5' 7" (1.702 m), weight 103.5 kg (228 lb 2.8 oz), last menstrual period 11/23/2015.    APPEARANCE: Well nourished, well developed, in no acute distress.    HEAD: Normocephalic, atraumatic.  EYES: PERRL. EOMI.  " Conjunctivae without injection and  anicteric  EARS: TM's intact. Light reflex normal. No retraction or perforation.    NOSE: Mucosa pink. Airway clear.  MOUTH & THROAT: No tonsillar enlargement. No pharyngeal erythema or exudate. No stridor.  NECK: Supple.   NODES: No cervical, axillary or inguinal lymph node enlargement.  CHEST: Lungs clear to auscultation.  No retractions are noted.  No rales or rhonchi are present.  CARDIOVASCULAR: Normal S1, S2. No rubs, murmurs or gallops.  ABDOMEN: Bowel sounds normal. Not distended. Soft. No tenderness or masses.  No ascites is noted.  MUSCULOSKELETAL:  There is no clubbing, cyanosis, or edema of the extremities x4.  There is full range of motion of the lumbar spine.  There is full range of motion of the extremities x4.  There is no deformity noted.    NEUROLOGIC:       Normal speech development.      Hearing normal.      Normal gait.      Motor and sensory exams grossly normal.      DTR's normal.  PSYCHIATRIC: Patient is alert and oriented x3.  Thought processes are all normal.  There is no homicidality.  There is no suicidality.  There is no evidence of psychosis.    LABORATORY/RADIOLOGY:   Chart reviewed.  We will update blood work today.    ASSESSMENT:   Right heel pain most consistent with heel spur and plantar fasciitis  Hypertension, well controlled  Hormone replacement therapy status post hysterectomy and oophorectomy  Fe def anemia, resolved    PLAN:  Over-the-counter orthotic  Podiatry referral  See GYN regarding the HRT  Prinzide  Lifestyle modification

## 2017-11-30 ENCOUNTER — OFFICE VISIT (OUTPATIENT)
Dept: PODIATRY | Facility: CLINIC | Age: 52
End: 2017-11-30
Attending: FAMILY MEDICINE
Payer: COMMERCIAL

## 2017-11-30 ENCOUNTER — HOSPITAL ENCOUNTER (OUTPATIENT)
Dept: RADIOLOGY | Facility: OTHER | Age: 52
Discharge: HOME | End: 2017-11-30
Attending: FAMILY MEDICINE
Payer: COMMERCIAL

## 2017-11-30 VITALS — WEIGHT: 228.19 LBS | BODY MASS INDEX: 35.82 KG/M2 | HEIGHT: 67 IN

## 2017-11-30 DIAGNOSIS — M72.2 PLANTAR FASCIITIS: Primary | ICD-10-CM

## 2017-11-30 DIAGNOSIS — M72.2 PLANTAR FASCIITIS: ICD-10-CM

## 2017-11-30 DIAGNOSIS — Z12.31 SCREENING MAMMOGRAM, ENCOUNTER FOR: ICD-10-CM

## 2017-11-30 PROCEDURE — 20550 NJX 1 TENDON SHEATH/LIGAMENT: CPT | Mod: RT,S$GLB,,

## 2017-11-30 PROCEDURE — 77067 SCR MAMMO BI INCL CAD: CPT | Mod: 26,,, | Performed by: RADIOLOGY

## 2017-11-30 PROCEDURE — 99203 OFFICE O/P NEW LOW 30 MIN: CPT | Mod: 25,S$GLB,,

## 2017-11-30 PROCEDURE — 77067 SCR MAMMO BI INCL CAD: CPT | Mod: TC

## 2017-11-30 PROCEDURE — 77063 BREAST TOMOSYNTHESIS BI: CPT | Mod: 26,,, | Performed by: RADIOLOGY

## 2017-11-30 RX ORDER — LISINOPRIL 10 MG/1
10 TABLET ORAL DAILY
COMMUNITY
End: 2018-03-29

## 2017-11-30 RX ORDER — DEXAMETHASONE SODIUM PHOSPHATE 4 MG/ML
4 INJECTION, SOLUTION INTRA-ARTICULAR; INTRALESIONAL; INTRAMUSCULAR; INTRAVENOUS; SOFT TISSUE
Status: COMPLETED | OUTPATIENT
Start: 2017-11-30 | End: 2017-11-30

## 2017-11-30 RX ADMIN — DEXAMETHASONE SODIUM PHOSPHATE 4 MG: 4 INJECTION, SOLUTION INTRA-ARTICULAR; INTRALESIONAL; INTRAMUSCULAR; INTRAVENOUS; SOFT TISSUE at 09:11

## 2017-11-30 NOTE — PATIENT INSTRUCTIONS
The Sof Sole FIT Series features three distinct insoles with specific arch heights and tuned high-rebound foam. This combination allows them to work in conjunction with your foot type, helping to promote the natural biomechanics of your unique stride. By doing so, FIT insoles improve footwear fit, performance and comfort.  The FIT High Arch insole features:   55 Durometer foam in heel/arch, 40 Durometer foam in forefoot   3.3cm arch height, ideal for high arch types   Anatomical nylon plate  - See more at: [email or web address]  Can be purchased at:    [employer]- [address] [facility]. [place]   Finish Line- [address] [facility] [address] [facility] [place]   [employer] [place]   [employer] [facility] [place]-  [employer]                                  Spenco Orthotic Arch Supports                               SPENCO Orthotic Arch Supports (AKA Spenco Orthotic Insoles) are ¾ length nylon (plastic arch supports that are covered with [employer]intoit classic green neoprene cushion material. SPENCO Orthotic Arch Supports are designed to support the medial and lateral arch. A SPENCO Orthotic Arch Support is ideal for people that need corrective support, but have tried other higher or harder orthotics and found them to be uncomfortable to wear. The nylon support of the SPENCO Orthotic Arch support is softer and more flexible than plastics used in other, harder orthotics and arch supports. This means that people with naturally lower arches or people with extremely flat feet with find that these arch supports are more comfortable to get used to than other higher arch supports.  May be obtained at:    Spenco.com  Amazon.com

## 2017-11-30 NOTE — PROGRESS NOTES
Subjective:      Chief Complaint   Patient presents with    Heel Pain     Rt heel pain , but patient states that she is starting to have pain in the Lt       HPI: Patient presents to the clinic c/o bilateral heel pain worse on the right which is worse after a few steps after getting out of bed or resting.  Pain is described as sharp and along the plantar inner aspect of the foot.  Patient has tried gel supports and new tennsis and this does not help. Shoegear:  Tennis, dress.      Review of Systems  Constitution: Negative for chills, fever, weakness and malaise/fatigue.   HEENT: Negative for headaches.   Cardiovascular: Negative for chest pain and claudication.   Respiratory: Negative for cough and shortness of breath.   Musculoskeletal: Positive for foot pain.  Negative for muscle cramps and muscle weakness.   Gastrointestinal: Negative for nausea and vomiting.   Neurological: Negative for numbness and paresthesias.   Dermatological: Negative for wound.         Objective:      Physical Exam  Constitutional:  Patient is oriented to person, place, and time. Vital signs are normal.  Appears well-developed and well-nourished.     Vascular:  Dorsalis pedis pulses are 2+ on the right side, and 2+ on the left side.   Posterior tibial pulses are 2+ on the right side, and 2+ on the left side.   + digital hair growth, no swelling, capillary fill time to all toes <3 seconds    Skin/Dermatological:  Skin is warm and intact. No rash noted. No cyanosis. no clubbing. No open wounds.      Musculoskeletal:      Normal range of motion bilateral ankle and pedal joints except ankle joint dorsiflexion limited to 5 degrees with knee extended and flexed, no swelling or deformity, no tenderness or crepitus. Achilles tendon exhibits no pain or defects.   Tenderness to the medial calcaneal tuberclesbilateral foot , - tinel's sign, negative heel squeeze test.  Overall, pes rectus architecture with tight plantar fascia.        Neurological:  Normal strength lower extremity muscles, normal tone.   Reflex Scores:       Patellar reflexes are 2+ on the right side and 2+ on the left side.       Achilles reflexes are 2+ on the right side and 2+ on the left side.       No deficits in 2 point tactile discrimination, vibratory, light touch or pressure             Assessment:       1. Plantar fasciitis - Right Foot    2. Plantar fasciitis - Left Foot        Plan:       Plantar fasciitis - Right Foot    Plantar fasciitis - Left Foot       bilateral foot:  We discussed the etiology of the problem and the patient was given literature regarding plantar fasciitis and stretching.  With the patient's permission, an injection of 12 mg of kenalog, 4 mg of dexamethasone phosphate and 1 cc of lidocaine 1% and 1 cc of marcaine 0.5% into the right medial plantar heel.  Patient tolerated well. We also discussed proper shoe gear.   Spenco orthotic supports were also recommended.  We discussed the possibility of another injection or physical therapy or surgery should this not resolve the problem.  Return to clinic 4 weeks.

## 2017-12-28 ENCOUNTER — OFFICE VISIT (OUTPATIENT)
Dept: PODIATRY | Facility: CLINIC | Age: 52
End: 2017-12-28
Payer: COMMERCIAL

## 2017-12-28 VITALS — HEIGHT: 67 IN | BODY MASS INDEX: 35.82 KG/M2 | WEIGHT: 228.19 LBS

## 2017-12-28 DIAGNOSIS — M72.2 PLANTAR FASCIITIS: Primary | ICD-10-CM

## 2017-12-28 DIAGNOSIS — M72.2 PLANTAR FASCIITIS, LEFT: ICD-10-CM

## 2017-12-28 PROCEDURE — 99213 OFFICE O/P EST LOW 20 MIN: CPT | Mod: S$GLB,,,

## 2017-12-28 NOTE — PROGRESS NOTES
Subjective:      Chief Complaint   Patient presents with    Follow-up     4 week f/u        HPI: Patient returns for f/u to right > left plantar fasciitis, she had a steroid injection right heel 4 week ago and just obtained the Spenco orthotics, doing stretches.  Shas no pain today..      Review of Systems  Constitution: Negative for chills, fever, weakness and malaise/fatigue.   HEENT: Negative for headaches.   Cardiovascular: Negative for chest pain and claudication.   Respiratory: Negative for cough and shortness of breath.   Musculoskeletal: - for foot pain.  Negative for muscle cramps and muscle weakness.   Gastrointestinal: Negative for nausea and vomiting.   Neurological: Negative for numbness and paresthesias.   Dermatological: Negative for wound.         Objective:      Physical Exam  Constitutional:  Patient is oriented to person, place, and time. Vital signs are normal.  Appears well-developed and well-nourished.     Vascular:  Dorsalis pedis pulses are 2+ on the right side, and 2+ on the left side.   Posterior tibial pulses are 2+ on the right side, and 2+ on the left side.   + digital hair growth, no swelling, capillary fill time to all toes <3 seconds    Skin/Dermatological:  Skin is warm and intact. No rash noted. No cyanosis. no clubbing. No open wounds.      Musculoskeletal:      Normal range of motion bilateral ankle and pedal joints except ankle joint dorsiflexion limited to 5 degrees with knee extended and flexed, no swelling or deformity, no tenderness or crepitus. Achilles tendon exhibits no pain or defects.   No tenderness to the medial calcaneal tubercles bilaterally , - tinel's sign, negative heel squeeze test.  Overall, pes rectus architecture with tight plantar fascia.       Neurological:  Normal strength lower extremity muscles, normal tone.   Reflex Scores:       Patellar reflexes are 2+ on the right side and 2+ on the left side.       Achilles reflexes are 2+ on the right side and 2+  on the left side.       No deficits in 2 point tactile discrimination, vibratory, light touch or pressure             Assessment:       1. Plantar fasciitis - Right Foot    2. Plantar fasciitis, left        Plan:       Plantar fasciitis - Right Foot    Plantar fasciitis, left       bilateral foot:  Continue Spenco orthotic supports, continue stretching, sipportive shoes  We discussed the possibility of another injection or physical therapy or surgery should this return  Return to clinic prn.

## 2018-01-12 ENCOUNTER — OFFICE VISIT (OUTPATIENT)
Dept: URGENT CARE | Facility: CLINIC | Age: 53
End: 2018-01-12
Payer: COMMERCIAL

## 2018-01-12 VITALS
WEIGHT: 228 LBS | BODY MASS INDEX: 35.79 KG/M2 | DIASTOLIC BLOOD PRESSURE: 80 MMHG | TEMPERATURE: 102 F | RESPIRATION RATE: 18 BRPM | OXYGEN SATURATION: 96 % | HEART RATE: 114 BPM | HEIGHT: 67 IN | SYSTOLIC BLOOD PRESSURE: 130 MMHG

## 2018-01-12 DIAGNOSIS — J10.1 INFLUENZA A: Primary | ICD-10-CM

## 2018-01-12 LAB
CTP QC/QA: YES
FLUAV AG NPH QL: POSITIVE
FLUBV AG NPH QL: NEGATIVE

## 2018-01-12 PROCEDURE — 99203 OFFICE O/P NEW LOW 30 MIN: CPT | Mod: S$GLB,,, | Performed by: PHYSICIAN ASSISTANT

## 2018-01-12 RX ORDER — BENZONATATE 100 MG/1
100 CAPSULE ORAL 3 TIMES DAILY PRN
Qty: 30 CAPSULE | Refills: 0 | Status: SHIPPED | OUTPATIENT
Start: 2018-01-12 | End: 2018-03-29

## 2018-01-12 RX ORDER — PROMETHAZINE HYDROCHLORIDE AND DEXTROMETHORPHAN HYDROBROMIDE 6.25; 15 MG/5ML; MG/5ML
5 SYRUP ORAL NIGHTLY PRN
Qty: 60 ML | Refills: 0 | Status: SHIPPED | OUTPATIENT
Start: 2018-01-12 | End: 2018-03-29

## 2018-01-12 NOTE — LETTER
January 12, 2018      [facility] [address] [place] [address]  Phone: [phone]  Fax: [phone]       Patient: Tahira Jenkins   YOB: 1965  Date of Visit: 01/12/2018    To Whom It May Concern:    Dennis Jenkins  was at [facility] on 01/12/2018. She may return to work/school on 1/16/2018 with no restrictions. If you have any questions or concerns, or if I can be of further assistance, please do not hesitate to contact me.    Sincerely,    Angeles Ashley PA-C

## 2018-01-12 NOTE — PROGRESS NOTES
"Subjective:       Patient ID: Tahira Jenkins is a 52 y.o. female.    Vitals:  height is 5' 7" (1.702 m) and weight is 103.4 kg (228 lb). Her oral temperature is 102.1 °F (38.9 °C) (abnormal). Her blood pressure is 130/80 and her pulse is 114 (abnormal). Her respiration is 18 and oxygen saturation is 96%.     Chief Complaint: Sinus Problem    Pt c/o cough and body aches since Wednesday and got worse yesterday.      Sinus Problem   This is a new problem. The current episode started in the past 7 days. The problem has been gradually worsening since onset. The maximum temperature recorded prior to her arrival was 102 - 102.9 F. The fever has been present for less than 1 day. Her pain is at a severity of 9/10. The pain is severe. Associated symptoms include chills, congestion, coughing, ear pain and a sore throat. Pertinent negatives include no headaches, hoarse voice or shortness of breath. Treatments tried: robitussin. The treatment provided no relief.     Review of Systems   Constitution: Positive for chills and fever. Negative for malaise/fatigue.        Body aches  Face pain   HENT: Positive for congestion, ear pain and sore throat. Negative for hoarse voice.         Chest congestion and discomfort.  Ears are ringing   Eyes: Negative for discharge and redness.   Cardiovascular: Negative for chest pain, dyspnea on exertion and leg swelling.   Respiratory: Positive for cough. Negative for shortness of breath, sputum production and wheezing.    Skin: Negative for rash.   Musculoskeletal: Positive for back pain and myalgias.   Gastrointestinal: Negative for abdominal pain, diarrhea, nausea and vomiting.   Neurological: Negative for headaches.       Objective:      Physical Exam   Constitutional: She is oriented to person, place, and time. She appears well-developed and well-nourished. She appears ill. No distress.   HENT:   Head: Normocephalic and atraumatic.   Right Ear: Hearing, tympanic membrane, external ear " and ear canal normal.   Left Ear: Hearing, tympanic membrane, external ear and ear canal normal.   Nose: Mucosal edema present. Right sinus exhibits no maxillary sinus tenderness and no frontal sinus tenderness. Left sinus exhibits no maxillary sinus tenderness and no frontal sinus tenderness.   Mouth/Throat: Uvula is midline. Posterior oropharyngeal erythema present. No oropharyngeal exudate or posterior oropharyngeal edema.   Eyes: Conjunctivae, EOM and lids are normal. Right eye exhibits no discharge. Left eye exhibits no discharge.   Neck: Normal range of motion. Neck supple.   Cardiovascular: Normal rate, regular rhythm and normal heart sounds.  Exam reveals no gallop and no friction rub.    No murmur heard.  Pulmonary/Chest: Effort normal and breath sounds normal. No respiratory distress. She has no decreased breath sounds. She has no wheezes. She has no rhonchi. She has no rales.   Musculoskeletal: Normal range of motion.   Lymphadenopathy:        Head (right side): No submandibular and no tonsillar adenopathy present.        Head (left side): No submandibular and no tonsillar adenopathy present.   Neurological: She is alert and oriented to person, place, and time.   Skin: Skin is warm and dry. No rash noted. No erythema.   Psychiatric: She has a normal mood and affect. Her behavior is normal.   Nursing note and vitals reviewed.      Assessment:       1. Influenza A        Office Visit on 01/12/2018   Component Date Value Ref Range Status    Rapid Influenza A Ag 01/12/2018 Positive* Negative Final    Rapid Influenza B Ag 01/12/2018 Negative  Negative Final     Acceptable 01/12/2018 Yes   Final     Plan:         Influenza A  -     POCT Influenza A/B  -     benzonatate (TESSALON PERLES) 100 MG capsule; Take 1 capsule (100 mg total) by mouth 3 (three) times daily as needed for Cough.  Dispense: 30 capsule; Refill: 0  -     promethazine-dextromethorphan (PROMETHAZINE-DM) 6.25-15 mg/5 mL Syrp;  Take 5 mLs by mouth nightly as needed.  Dispense: 60 mL; Refill: 0      Patient Instructions     -Take tessalon perles as needed during the day for cough and cough syrup at night.    -Rest and stay hydrated.  -Tylenol every 4 hours OR ibuprofen every 6 hours as needed for pain/fever.  -Flonase OTC or Nasacort OTC for nasal congestion.  -Warm face compresses to help with facial sinus pain/pressure.  -Simple foods like chicken noodle soup.  -Delsym helps with coughing at night  -Zyrtec/Claritin during the day & Benadryl at night may help with allergies.    Please follow up with your primary care provider within 2-5 days if your signs and symptoms have not resolved or worsen.     If your condition worsens or fails to improve we recommend that you receive another evaluation at the emergency room immediately or contact your primary medical clinic to discuss your concerns.   You must understand that you have received an Urgent Care treatment only and that you may be released before all of your medical problems are known or treated. You, the patient, will arrange for follow up care as instructed.         The Flu (Influenza)     The virus that causes the flu spreads through the air in droplets when someone who has the flu coughs, sneezes, laughs, or talks.   The flu (influenza) is an infection that affects your respiratory tract. This tract is made up of your mouth, nose, and lungs, and the passages between them. Unlike a cold, the flu can make you very ill. And it can lead to pneumonia, a serious lung infection. The flu can have serious complications and even cause death.  Who is at risk for the flu?  Anyone can get the flu. But you are more likely to become infected if you:  · Have a weakened immune system  · Work in a healthcare setting where you may be exposed to flu germs  · Live or work with someone who has the flu  · Havent had an annual flu shot  How does the flu spread?  The flu is caused by a virus. The  virus spreads through the air in droplets when someone who has the flu coughs, sneezes, laughs, or talks. You can become infected when you inhale these viruses directly. You can also become infected when you touch a surface on which the droplets have landed and then transfer the germs to your eyes, nose, or mouth. Touching used tissues, or sharing utensils, drinking glasses, or a toothbrush from an infected person can expose you to flu viruses, too.  What are the symptoms of the flu?  Flu symptoms tend to come on quickly and may last a few days to a few weeks. They include:  · Fever usually higher than 100.4°F  (38°C) and chills  · Sore throat and headache  · Dry cough  · Runny nose  · Tiredness and weakness  · Muscle aches  Who is at risk for flu complications?  For some people, the flu can be very serious. The risk for complications is greater for:  · Children younger than age 5  · Adults ages 65 and older  · People with a chronic illness such as diabetes or heart, kidney, or lung disease  · People who live in a nursing home or long-term care facility   How is the flu treated?  The flu usually gets better after 7 days or so. In some cases, your healthcare provider may prescribe an antiviral medicine. This may help you get well a little sooner. For the medicine to help, you need to take it as soon as possible (ideally within 48 hours) after your symptoms start. If you develop pneumonia or other serious illness, you may need to stay in the hospital.  Easing flu symptoms  · Drink lots of fluids such as water, juice, and warm soup. A good rule is to drink enough so that you urinate your normal amount.  · Get plenty of rest.  · Ask your healthcare provider what to take for fever and pain.  · Call your provider if your fever is 100.4°F (38°C) or higher, or you become dizzy, lightheaded, or short of breath.  Taking steps to protect others  · Wash your hands often, especially after coughing or sneezing. Or clean your  hands with an alcohol-based hand  containing at least 60% alcohol.  · Cough or sneeze into a tissue. Then throw the tissue away and wash your hands. If you dont have a tissue, cough and sneeze into your elbow.  · Stay home until at least 24 hours after you no longer have a fever or chills. Be sure the fever isnt being hidden by fever-reducing medicine.  · Dont share food, utensils, drinking glasses, or a toothbrush with others.  · Ask your healthcare provider if others in your household should get antiviral medicine to help them avoid infection.  How can the flu be prevented?  · One of the best ways to avoid the flu is to get a flu vaccine each year. The virus that causes the flu changes from year to year. For that reason, healthcare providers recommend getting the flu vaccine each year, as soon as it's available in your area. The vaccine is given as a shot. Your healthcare provider can tell you which vaccine is right for you. A nasal spray is also available but is not recommended for the 6882-5535 flu season. The CDC says this is because the nasal spray did not seem to protect against the flu over the last several flu seasons. In the past, it was meant for people ages 2 to 49.  · Wash your hands often. Frequent handwashing is a proven way to help prevent infection.  · Carry an alcohol-based hand gel containing at least 60% alcohol. Use it when you can't use soap and water. Then wash your hands as soon as you can.  · Avoid touching your eyes, nose, and mouth.  · At home and work, clean phones, computer keyboards, and toys often with disinfectant wipes.  · If possible, avoid close contact with others who have the flu or symptoms of the flu.  Handwashing tips  Handwashing is one of the best ways to prevent many common infections. If you are caring for or visiting someone with the flu, wash your hands each time you enter and leave the room. Follow these steps:  · Use warm water and plenty of soap. Rub your  hands together well.  · Clean the whole hand, including under your nails, between your fingers, and up the wrists.  · Wash for at least 15 seconds.  · Rinse, letting the water run down your fingers, not up your wrists.  · Dry your hands well. Use a paper towel to turn off the faucet and open the door.  Using alcohol-based hand   Alcohol-based hand  are also a good choice. Use them when you can't use soap and water. Follow these steps:  · Squeeze about a tablespoon of gel into the palm of one hand.  · Rub your hands together briskly, cleaning the backs of your hands, the palms, between your fingers, and up the wrists.  · Rub until the gel is gone and your hands are completely dry.  Preventing the flu in healthcare settings  The flu is a special concern for people in hospitals and long-term care facilities. To help prevent the spread of flu, many hospitals and nursing homes take these steps:  · Healthcare providers wash their hands or use an alcohol-based hand  before and after treating each patient.  · People with the flu have private rooms and bathrooms or share a room with someone with the same infection.  · People who are at high risk for the flu but don't have it are encouraged to get the flu and pneumonia vaccines.  · All healthcare workers are encouraged or required to get flu shots.   Date Last Reviewed: 12/1/2016  © 9950-8046 [employer]. [address] [place] [address], [place] [address]. All rights reserved. This information is not intended as a substitute for professional medical care. Always follow your healthcare professional's instructions.

## 2018-01-12 NOTE — PATIENT INSTRUCTIONS
-Take tessalon perles as needed during the day for cough and cough syrup at night.    -Rest and stay hydrated.  -Tylenol every 4 hours OR ibuprofen every 6 hours as needed for pain/fever.  -Flonase OTC or Nasacort OTC for nasal congestion.  -Warm face compresses to help with facial sinus pain/pressure.  -Simple foods like chicken noodle soup.  -Delsym helps with coughing at night  -Zyrtec/Claritin during the day & Benadryl at night may help with allergies.    Please follow up with your primary care provider within 2-5 days if your signs and symptoms have not resolved or worsen.     If your condition worsens or fails to improve we recommend that you receive another evaluation at the emergency room immediately or contact your primary medical clinic to discuss your concerns.   You must understand that you have received an Urgent Care treatment only and that you may be released before all of your medical problems are known or treated. You, the patient, will arrange for follow up care as instructed.         The Flu (Influenza)     The virus that causes the flu spreads through the air in droplets when someone who has the flu coughs, sneezes, laughs, or talks.   The flu (influenza) is an infection that affects your respiratory tract. This tract is made up of your mouth, nose, and lungs, and the passages between them. Unlike a cold, the flu can make you very ill. And it can lead to pneumonia, a serious lung infection. The flu can have serious complications and even cause death.  Who is at risk for the flu?  Anyone can get the flu. But you are more likely to become infected if you:  · Have a weakened immune system  · Work in a healthcare setting where you may be exposed to flu germs  · Live or work with someone who has the flu  · Havent had an annual flu shot  How does the flu spread?  The flu is caused by a virus. The virus spreads through the air in droplets when someone who has the flu coughs, sneezes, laughs, or talks.  You can become infected when you inhale these viruses directly. You can also become infected when you touch a surface on which the droplets have landed and then transfer the germs to your eyes, nose, or mouth. Touching used tissues, or sharing utensils, drinking glasses, or a toothbrush from an infected person can expose you to flu viruses, too.  What are the symptoms of the flu?  Flu symptoms tend to come on quickly and may last a few days to a few weeks. They include:  · Fever usually higher than 100.4°F  (38°C) and chills  · Sore throat and headache  · Dry cough  · Runny nose  · Tiredness and weakness  · Muscle aches  Who is at risk for flu complications?  For some people, the flu can be very serious. The risk for complications is greater for:  · Children younger than age 5  · Adults ages 65 and older  · People with a chronic illness such as diabetes or heart, kidney, or lung disease  · People who live in a nursing home or long-term care facility   How is the flu treated?  The flu usually gets better after 7 days or so. In some cases, your healthcare provider may prescribe an antiviral medicine. This may help you get well a little sooner. For the medicine to help, you need to take it as soon as possible (ideally within 48 hours) after your symptoms start. If you develop pneumonia or other serious illness, you may need to stay in the hospital.  Easing flu symptoms  · Drink lots of fluids such as water, juice, and warm soup. A good rule is to drink enough so that you urinate your normal amount.  · Get plenty of rest.  · Ask your healthcare provider what to take for fever and pain.  · Call your provider if your fever is 100.4°F (38°C) or higher, or you become dizzy, lightheaded, or short of breath.  Taking steps to protect others  · Wash your hands often, especially after coughing or sneezing. Or clean your hands with an alcohol-based hand  containing at least 60% alcohol.  · Cough or sneeze into a tissue.  Then throw the tissue away and wash your hands. If you dont have a tissue, cough and sneeze into your elbow.  · Stay home until at least 24 hours after you no longer have a fever or chills. Be sure the fever isnt being hidden by fever-reducing medicine.  · Dont share food, utensils, drinking glasses, or a toothbrush with others.  · Ask your healthcare provider if others in your household should get antiviral medicine to help them avoid infection.  How can the flu be prevented?  · One of the best ways to avoid the flu is to get a flu vaccine each year. The virus that causes the flu changes from year to year. For that reason, healthcare providers recommend getting the flu vaccine each year, as soon as it's available in your area. The vaccine is given as a shot. Your healthcare provider can tell you which vaccine is right for you. A nasal spray is also available but is not recommended for the 8717-6800 flu season. The CDC says this is because the nasal spray did not seem to protect against the flu over the last several flu seasons. In the past, it was meant for people ages 2 to 49.  · Wash your hands often. Frequent handwashing is a proven way to help prevent infection.  · Carry an alcohol-based hand gel containing at least 60% alcohol. Use it when you can't use soap and water. Then wash your hands as soon as you can.  · Avoid touching your eyes, nose, and mouth.  · At home and work, clean phones, computer keyboards, and toys often with disinfectant wipes.  · If possible, avoid close contact with others who have the flu or symptoms of the flu.  Handwashing tips  Handwashing is one of the best ways to prevent many common infections. If you are caring for or visiting someone with the flu, wash your hands each time you enter and leave the room. Follow these steps:  · Use warm water and plenty of soap. Rub your hands together well.  · Clean the whole hand, including under your nails, between your fingers, and up the  wrists.  · Wash for at least 15 seconds.  · Rinse, letting the water run down your fingers, not up your wrists.  · Dry your hands well. Use a paper towel to turn off the faucet and open the door.  Using alcohol-based hand   Alcohol-based hand  are also a good choice. Use them when you can't use soap and water. Follow these steps:  · Squeeze about a tablespoon of gel into the palm of one hand.  · Rub your hands together briskly, cleaning the backs of your hands, the palms, between your fingers, and up the wrists.  · Rub until the gel is gone and your hands are completely dry.  Preventing the flu in healthcare settings  The flu is a special concern for people in hospitals and long-term care facilities. To help prevent the spread of flu, many hospitals and nursing homes take these steps:  · Healthcare providers wash their hands or use an alcohol-based hand  before and after treating each patient.  · People with the flu have private rooms and bathrooms or share a room with someone with the same infection.  · People who are at high risk for the flu but don't have it are encouraged to get the flu and pneumonia vaccines.  · All healthcare workers are encouraged or required to get flu shots.   Date Last Reviewed: 12/1/2016  © 9669-9354 [employer]. [address] [place] [address], [place] [address]. All rights reserved. This information is not intended as a substitute for professional medical care. Always follow your healthcare professional's instructions.

## 2018-03-19 ENCOUNTER — PATIENT OUTREACH (OUTPATIENT)
Dept: ADMINISTRATIVE | Facility: HOSPITAL | Age: 53
End: 2018-03-19

## 2018-03-19 NOTE — PROGRESS NOTES
[facility] is committed to your overall health.  To help you get the most out of each of your visits, we will review your information to make sure you are up to date on all of your recommended tests and/or procedures.       Your PCP  Arthur Smiley MD   found that you may be due for:       Health Maintenance Due   Topic Date Due    Colonoscopy  07/26/2017             If you have had any of the above done at another facility, please bring the records or information with you so that your record at [facility] will be complete.  If you would like to schedule any of these, please contact me.     If you are currently taking medication, please bring it with you to your appointment for review.     Also, if you have any type of Advanced Directives, please bring them with you to your office visit so we may scan them into your chart.       Thank you for Choosing [facility] for your healthcare needs.        Additional Information  If you have questions, you can email [email or web address] or call [phone]  to talk to our [facility] staff. Remember, [facility] is NOT to be used for urgent needs. For medical emergencies, dial 911.

## 2018-03-29 ENCOUNTER — OFFICE VISIT (OUTPATIENT)
Dept: INTERNAL MEDICINE | Facility: CLINIC | Age: 53
End: 2018-03-29
Attending: FAMILY MEDICINE
Payer: COMMERCIAL

## 2018-03-29 VITALS
BODY MASS INDEX: 36.36 KG/M2 | OXYGEN SATURATION: 99 % | SYSTOLIC BLOOD PRESSURE: 146 MMHG | WEIGHT: 232.13 LBS | DIASTOLIC BLOOD PRESSURE: 84 MMHG | HEART RATE: 92 BPM

## 2018-03-29 DIAGNOSIS — R53.83 FATIGUE, UNSPECIFIED TYPE: ICD-10-CM

## 2018-03-29 DIAGNOSIS — R60.9 DEPENDENT EDEMA: ICD-10-CM

## 2018-03-29 DIAGNOSIS — Z00.00 PREVENTATIVE HEALTH CARE: Primary | ICD-10-CM

## 2018-03-29 DIAGNOSIS — I10 ESSENTIAL HYPERTENSION: ICD-10-CM

## 2018-03-29 PROCEDURE — 99396 PREV VISIT EST AGE 40-64: CPT | Mod: S$GLB,,, | Performed by: FAMILY MEDICINE

## 2018-03-29 PROCEDURE — 99999 PR PBB SHADOW E&M-EST. PATIENT-LVL III: CPT | Mod: PBBFAC,,, | Performed by: FAMILY MEDICINE

## 2018-03-29 RX ORDER — OXYBUTYNIN CHLORIDE 5 MG/1
5 TABLET ORAL 2 TIMES DAILY
Qty: 60 TABLET | Refills: 11 | Status: SHIPPED | OUTPATIENT
Start: 2018-03-29 | End: 2019-05-24

## 2018-03-29 RX ORDER — HYDROCHLOROTHIAZIDE 25 MG/1
25 TABLET ORAL DAILY
Qty: 30 TABLET | Refills: 11 | Status: SHIPPED | OUTPATIENT
Start: 2018-03-29 | End: 2018-11-29

## 2018-03-29 NOTE — PROGRESS NOTES
CHIEF COMPLAINT: Fatigue     HISTORY OF PRESENT ILLNESS: The patient is a healthy 52-year-old black female. She is coming in for her Annual check up.      Pt has complaints about foot swelling for the past 1 month. It is worse when standing or doing chores. There is no pain, no SOB, no neurological symptoms.      Pt also reports 1-2 months of nocturia. This occurs 4-5 times a night and has made it difficult to sleep. She only sleeps 5-6 hrs and this has exacerbated her fatigue. She has no other difficulty urination, no blood/pain on urination, no change in frequency.     She has HTN and currently on Norvasc for control. BP reading in clinic was elevated, pt does not record BP at home. Change in BP is likely due to discontinued Lisinopril due to SE.     REVIEW OF SYSTEMS:  GENERAL: No fever, chills, fatigability or weight loss.  SKIN: No rashes, itching or changes in color or texture of skin.  HEAD: No headaches or recent head trauma.  EYES: Visual acuity fine. No photophobia, ocular pain or diplopia.  EARS: Denies ear pain, discharge or vertigo.  NOSE: No loss of smell, no epistaxis or postnasal drip.  MOUTH & THROAT: No hoarseness or change in voice. No excessive gum bleeding.  NODES: Denies swollen glands.  CHEST: Denies COSTA, cyanosis, wheezing, cough and sputum production.  CARDIOVASCULAR: Denies chest pain, PND, orthopnea or reduced exercise tolerance.  ABDOMEN: Appetite fine. No weight loss. Denies diarrhea, abdominal pain, hematemesis or blood in stool.  URINARY: No flank pain, dysuria or hematuria.  PERIPHERAL VASCULAR: No claudication or cyanosis.  MUSCULOSKELETAL: No joint stiffness or swelling. Denies back pain.  Except as above  NEUROLOGIC: No history of seizures, paralysis, alteration of gait or coordination.     SOCIAL HISTORY: The patient does not smoke.  The patient consumes alcohol socially.  The patient works at [employer].     PHYSICAL EXAMINATION:   Blood pressure (!) 146/84, pulse 92, weight  105.3 kg (232 lb 2.3 oz), last menstrual period 11/23/2015, SpO2 99 %.  APPEARANCE: Well nourished, well developed, in no acute distress.    HEAD: Normocephalic, atraumatic.  EYES: PERRL. EOMI.  Conjunctivae without injection and  anicteric  EARS: TM's intact. Light reflex normal. No retraction or perforation.    NOSE: Mucosa pink. Airway clear.  MOUTH & THROAT: No tonsillar enlargement. No pharyngeal erythema or exudate. No stridor.  NECK: Supple.   NODES: No cervical, axillary or inguinal lymph node enlargement.  CHEST: Lungs clear to auscultation.  No retractions are noted.  No rales or rhonchi are present.  CARDIOVASCULAR: Normal S1, S2. No rubs, murmurs or gallops.  ABDOMEN: Bowel sounds normal. Not distended. Soft. No tenderness or masses.  No ascites is noted.  MUSCULOSKELETAL:  There is no clubbing, cyanosis, or edema of the extremities x4.  There is full range of motion of the lumbar spine.  There is full range of motion of the extremities x4.  There is no deformity noted.    NEUROLOGIC:       Normal speech development.      Hearing normal.      Normal gait.      Motor and sensory exams grossly normal.      DTR's normal.  PSYCHIATRIC: Patient is alert and oriented x3.  Thought processes are all normal.  There is no homicidality.  There is no suicidality.  There is no evidence of psychosis.     LABORATORY/RADIOLOGY:   Chart reviewed.  We will update blood work today.     ASSESSMENT:   Annual  Essential hypertension  Dependent edema: likely SE of Amlodipine  Fatigue, unspecified type: due to nocturia and difficulty sleeping  Nocturia     PLAN:     Preventative health care  -     Comprehensive metabolic panel; Future  -     Lipid panel; Future  -     TSH; Future  -     Hemoglobin A1c; Future  -     CBC auto differential; Future     Essential hypertension        -     hydroCHLOROthiazide (HYDRODIURIL) 25 MG tablet; Take 1 tablet (25 mg total) by mouth once daily.     Dependent edema     Fatigue, unspecified  type  -     Comprehensive metabolic panel; Future  -     Lipid panel; Future  -     TSH; Future  -     Hemoglobin A1c; Future  -     CBC auto differential; Future     Nocturia  -     oxybutynin (DITROPAN) 5 MG Tab; Take 1 tablet (5 mg total) by mouth 2 (two) times daily.

## 2018-03-29 NOTE — MEDICAL/APP STUDENT
CHIEF COMPLAINT: Fatigue     HISTORY OF PRESENT ILLNESS: The patient is a healthy 52-year-old black female. She is coming in for her Annual check up.     Pt has complaints about foot swelling for the past 1 month. It is worse when standing or doing chores. There is no pain, no SOB, no neurological symptoms.     Pt also reports 1-2 months of nocturia. This occurs 4-5 times a night and has made it difficult to sleep. She only sleeps 5-6 hrs and this has exacerbated her fatigue. She has no other difficulty urination, no blood/pain on urination, no change in frequency.     She has HTN and currently on Norvasc for control. BP reading in clinic was elevated, pt does not record BP at home. Change in BP is likely due to discontinued Lisinopril due to SE.     REVIEW OF SYSTEMS:  GENERAL: No fever, chills, fatigability or weight loss.  SKIN: No rashes, itching or changes in color or texture of skin.  HEAD: No headaches or recent head trauma.  EYES: Visual acuity fine. No photophobia, ocular pain or diplopia.  EARS: Denies ear pain, discharge or vertigo.  NOSE: No loss of smell, no epistaxis or postnasal drip.  MOUTH & THROAT: No hoarseness or change in voice. No excessive gum bleeding.  NODES: Denies swollen glands.  CHEST: Denies COSTA, cyanosis, wheezing, cough and sputum production.  CARDIOVASCULAR: Denies chest pain, PND, orthopnea or reduced exercise tolerance.  ABDOMEN: Appetite fine. No weight loss. Denies diarrhea, abdominal pain, hematemesis or blood in stool.  URINARY: No flank pain, dysuria or hematuria.  PERIPHERAL VASCULAR: No claudication or cyanosis.  MUSCULOSKELETAL: No joint stiffness or swelling. Denies back pain.  Except as above  NEUROLOGIC: No history of seizures, paralysis, alteration of gait or coordination.     SOCIAL HISTORY: The patient does not smoke.  The patient consumes alcohol socially.  The patient works at [employer].     PHYSICAL EXAMINATION:   Blood pressure (!) 146/84, pulse 92, weight 105.3 kg  (232 lb 2.3 oz), last menstrual period 11/23/2015, SpO2 99 %.      APPEARANCE: Well nourished, well developed, in no acute distress.    HEAD: Normocephalic, atraumatic.  EYES: PERRL. EOMI.  Conjunctivae without injection and  anicteric  EARS: TM's intact. Light reflex normal. No retraction or perforation.    NOSE: Mucosa pink. Airway clear.  MOUTH & THROAT: No tonsillar enlargement. No pharyngeal erythema or exudate. No stridor.  NECK: Supple.   NODES: No cervical, axillary or inguinal lymph node enlargement.  CHEST: Lungs clear to auscultation.  No retractions are noted.  No rales or rhonchi are present.  CARDIOVASCULAR: Normal S1, S2. No rubs, murmurs or gallops.  ABDOMEN: Bowel sounds normal. Not distended. Soft. No tenderness or masses.  No ascites is noted.  MUSCULOSKELETAL:  There is no clubbing, cyanosis, or edema of the extremities x4.  There is full range of motion of the lumbar spine.  There is full range of motion of the extremities x4.  There is no deformity noted.    NEUROLOGIC:       Normal speech development.      Hearing normal.      Normal gait.      Motor and sensory exams grossly normal.      DTR's normal.  PSYCHIATRIC: Patient is alert and oriented x3.  Thought processes are all normal.  There is no homicidality.  There is no suicidality.  There is no evidence of psychosis.     LABORATORY/RADIOLOGY:   Chart reviewed.  We will update blood work today.     ASSESSMENT:   Essential hypertension  Dependent edema: likely SE of Amlodipine  Fatigue, unspecified type: due to nocturia and difficulty sleeping  Nocturia    PLAN:    Preventative health care  -     Comprehensive metabolic panel; Future  -     Lipid panel; Future  -     TSH; Future  -     Hemoglobin A1c; Future  -     CBC auto differential; Future    Essential hypertension        -     hydroCHLOROthiazide (HYDRODIURIL) 25 MG tablet; Take 1 tablet (25 mg total) by mouth once daily.    Dependent edema    Fatigue, unspecified type  -      Comprehensive metabolic panel; Future  -     Lipid panel; Future  -     TSH; Future  -     Hemoglobin A1c; Future  -     CBC auto differential; Future    Nocturia  -     oxybutynin (DITROPAN) 5 MG Tab; Take 1 tablet (5 mg total) by mouth 2 (two) times daily.

## 2018-04-04 ENCOUNTER — LAB VISIT (OUTPATIENT)
Dept: LAB | Facility: OTHER | Age: 53
End: 2018-04-04
Attending: FAMILY MEDICINE
Payer: COMMERCIAL

## 2018-04-04 DIAGNOSIS — Z00.00 PREVENTATIVE HEALTH CARE: ICD-10-CM

## 2018-04-04 DIAGNOSIS — I10 ESSENTIAL HYPERTENSION: ICD-10-CM

## 2018-04-04 DIAGNOSIS — R53.83 FATIGUE, UNSPECIFIED TYPE: ICD-10-CM

## 2018-04-04 DIAGNOSIS — R60.9 DEPENDENT EDEMA: ICD-10-CM

## 2018-04-04 LAB
ALBUMIN SERPL BCP-MCNC: 4.1 G/DL
ALP SERPL-CCNC: 86 U/L
ALT SERPL W/O P-5'-P-CCNC: 18 U/L
ANION GAP SERPL CALC-SCNC: 8 MMOL/L
AST SERPL-CCNC: 14 U/L
BASOPHILS # BLD AUTO: 0.03 K/UL
BASOPHILS NFR BLD: 0.6 %
BILIRUB SERPL-MCNC: 0.5 MG/DL
BUN SERPL-MCNC: 14 MG/DL
CALCIUM SERPL-MCNC: 9.9 MG/DL
CHLORIDE SERPL-SCNC: 103 MMOL/L
CHOLEST SERPL-MCNC: 146 MG/DL
CHOLEST/HDLC SERPL: 2.5 {RATIO}
CO2 SERPL-SCNC: 29 MMOL/L
CREAT SERPL-MCNC: 1 MG/DL
DIFFERENTIAL METHOD: ABNORMAL
EOSINOPHIL # BLD AUTO: 0.1 K/UL
EOSINOPHIL NFR BLD: 2.8 %
ERYTHROCYTE [DISTWIDTH] IN BLOOD BY AUTOMATED COUNT: 14.8 %
EST. GFR  (AFRICAN AMERICAN): >60 ML/MIN/1.73 M^2
EST. GFR  (NON AFRICAN AMERICAN): >60 ML/MIN/1.73 M^2
ESTIMATED AVG GLUCOSE: 108 MG/DL
GLUCOSE SERPL-MCNC: 96 MG/DL
HBA1C MFR BLD HPLC: 5.4 %
HCT VFR BLD AUTO: 46.3 %
HDLC SERPL-MCNC: 58 MG/DL
HDLC SERPL: 39.7 %
HGB BLD-MCNC: 14.6 G/DL
LDLC SERPL CALC-MCNC: 73.4 MG/DL
LYMPHOCYTES # BLD AUTO: 1.4 K/UL
LYMPHOCYTES NFR BLD: 28.1 %
MCH RBC QN AUTO: 25.7 PG
MCHC RBC AUTO-ENTMCNC: 31.5 G/DL
MCV RBC AUTO: 82 FL
MONOCYTES # BLD AUTO: 0.5 K/UL
MONOCYTES NFR BLD: 9 %
NEUTROPHILS # BLD AUTO: 3 K/UL
NEUTROPHILS NFR BLD: 59.3 %
NONHDLC SERPL-MCNC: 88 MG/DL
PLATELET # BLD AUTO: 293 K/UL
PMV BLD AUTO: 9.7 FL
POTASSIUM SERPL-SCNC: 4 MMOL/L
PROT SERPL-MCNC: 7.5 G/DL
RBC # BLD AUTO: 5.67 M/UL
SODIUM SERPL-SCNC: 140 MMOL/L
TRIGL SERPL-MCNC: 73 MG/DL
TSH SERPL DL<=0.005 MIU/L-ACNC: 1.42 UIU/ML
WBC # BLD AUTO: 5.02 K/UL

## 2018-04-04 PROCEDURE — 83036 HEMOGLOBIN GLYCOSYLATED A1C: CPT

## 2018-04-04 PROCEDURE — 84443 ASSAY THYROID STIM HORMONE: CPT

## 2018-04-04 PROCEDURE — 36415 COLL VENOUS BLD VENIPUNCTURE: CPT

## 2018-04-04 PROCEDURE — 80061 LIPID PANEL: CPT

## 2018-04-04 PROCEDURE — 85025 COMPLETE CBC W/AUTO DIFF WBC: CPT

## 2018-04-04 PROCEDURE — 80053 COMPREHEN METABOLIC PANEL: CPT

## 2018-07-10 DIAGNOSIS — I10 ESSENTIAL HYPERTENSION: ICD-10-CM

## 2018-07-10 RX ORDER — AMLODIPINE BESYLATE 10 MG/1
TABLET ORAL
Qty: 90 TABLET | Refills: 3 | Status: SHIPPED | OUTPATIENT
Start: 2018-07-10 | End: 2019-05-24

## 2018-11-25 ENCOUNTER — OFFICE VISIT (OUTPATIENT)
Dept: URGENT CARE | Facility: CLINIC | Age: 53
End: 2018-11-25
Payer: COMMERCIAL

## 2018-11-25 VITALS
HEART RATE: 96 BPM | HEIGHT: 67 IN | BODY MASS INDEX: 36.41 KG/M2 | RESPIRATION RATE: 16 BRPM | SYSTOLIC BLOOD PRESSURE: 155 MMHG | OXYGEN SATURATION: 98 % | TEMPERATURE: 97 F | WEIGHT: 232 LBS | DIASTOLIC BLOOD PRESSURE: 90 MMHG

## 2018-11-25 DIAGNOSIS — M79.89 PAIN AND SWELLING OF LEFT LOWER LEG: Primary | ICD-10-CM

## 2018-11-25 DIAGNOSIS — M79.662 PAIN AND SWELLING OF LEFT LOWER LEG: Primary | ICD-10-CM

## 2018-11-25 DIAGNOSIS — M25.562 ARTHRALGIA OF KNEE, LEFT: ICD-10-CM

## 2018-11-25 PROCEDURE — 96372 THER/PROPH/DIAG INJ SC/IM: CPT | Mod: S$GLB,,, | Performed by: EMERGENCY MEDICINE

## 2018-11-25 PROCEDURE — 99214 OFFICE O/P EST MOD 30 MIN: CPT | Mod: 25,S$GLB,, | Performed by: EMERGENCY MEDICINE

## 2018-11-25 RX ORDER — BETAMETHASONE SODIUM PHOSPHATE AND BETAMETHASONE ACETATE 3; 3 MG/ML; MG/ML
9 INJECTION, SUSPENSION INTRA-ARTICULAR; INTRALESIONAL; INTRAMUSCULAR; SOFT TISSUE
Status: COMPLETED | OUTPATIENT
Start: 2018-11-25 | End: 2018-11-25

## 2018-11-25 RX ORDER — TRAMADOL HYDROCHLORIDE 50 MG/1
50 TABLET ORAL EVERY 6 HOURS PRN
Qty: 20 TABLET | Refills: 0 | Status: SHIPPED | OUTPATIENT
Start: 2018-11-25 | End: 2019-01-24 | Stop reason: SDUPTHER

## 2018-11-25 RX ADMIN — BETAMETHASONE SODIUM PHOSPHATE AND BETAMETHASONE ACETATE 9 MG: 3; 3 INJECTION, SUSPENSION INTRA-ARTICULAR; INTRALESIONAL; INTRAMUSCULAR; SOFT TISSUE at 01:11

## 2018-11-25 NOTE — PATIENT INSTRUCTIONS
Rest and ice and elevate the left knee and left lower extremity  Celestone shot 1.5 cc given in clinic for inflammation of the left knee and pain of the left knee  Out of concern of persistent left lower extremity swelling with associated left knee pain without trauma I have ordered a ultrasound of the left lower extremity to rule out DVT.  They will be calling you tomorrow to set up this within the next 24-48 hours.  If you are having any trouble setting this up, please call either here or doctor Baljit's office your primary care physician to help expedite this.  If for whatever reason you are unable to have this performed in the next 48 hr, as we have discussed, please go to the emergency department for ultrasound of the left lower extremity to rule out DVT, blood clot.      Otherwise, continue plan of care with elevation of the left lower extremity, anti-inflammatory meloxicam that you already have at the house with being sure to take with food on your stomach to avoid nausea.     I have also written for you a tramadol prescription for pain not relieved with the meloxicam.    See lower extremity edema sheet  See arthralgia sheet  See knee pain sheet  Go to the ER if worse in any way.    Follow-up with Dr. Smiley were removed next week.  Arthralgia    Arthralgia is the term for pain in or around the joint. It is a symptom, not a disease. This pain may involve one or more joints. In some cases, the pain moves from joint to joint.  There are many causes for joint pain. These include:  · Injury  · Osteoarthritis (wearing out of the joint surface)  · Gout (inflammation of the joint due to crystals in the joint fluid)  · Infection inside the joint    · Bursitis (inflammation of the fluid-filled sacs around the joint)  · Autoimmune disorders such as rheumatoid arthritis or lupus  · Tendonitis (inflammation of chords that attach muscle to bone)  Home care  · Rest the involved joint(s) until your symptoms  improve.   · You may be prescribed pain medicine. If none is prescribed, you may use acetaminophen or ibuprofen to control pain and inflammation.  Follow-up care  Follow up with your healthcare provider or as advised.  When to seek medical advice  Contact your healthcare provider right away if any of the following occurs:  · Pain, swelling, or redness of joint increases  · Pain worsens or recurs after a period of improvement  · Pain moves to other joints  · You cannot bear weight on the affected joint   · You cannot move the affected joint  · Joint appears deformed  · New rash appears  · Fever of 100.4ºF (38ºC) or higher, or as directed by your healthcare provider  Date Last Reviewed: 3/1/2017  © 4911-5045 [employer]. [address] [place] [address], [place] [address]. All rights reserved. This information is not intended as a substitute for professional medical care. Always follow your healthcare professional's instructions.        Knee Pain of Uncertain Cause    There are several common causes for knee pain. These can include:  · A sprain of the ligaments that support the joint  · An injury to the cartilage lining of the joint  · Arthritis from wear-and-tear or inflammation  There are other causes as well. There may also be swelling, reduced movement of the knee joint, and pain with walking. A definite diagnosis will still need to be made. If your symptoms do not improve, further follow-up and testing may be needed.  Home care  · Stay off the injured leg as much as possible until pain improves.  · Apply an ice pack over the injured area for 15 to 20 minutes every 3 to 6 hours. You should do this for the first 24 to 48 hours. You can make an ice pack by filling a plastic bag that seals at the top with ice cubes and then wrapping it with a thin towel. Continue to use ice packs for relief of pain and swelling as needed. As the ice melts, be careful to avoid getting your wrap, splint, or cast wet. After 48  hours, apply heat (warm shower or warm bath) for 15 to 20 minutes several times a day, or alternate ice and heat. If you have to wear a hook-and-loop knee brace, you can open it to apply the ice pack, or heat, directly to the knee. Never put ice directly on the skin. Always wrap the ice in a towel or other type of cloth.  · You may use over-the-counter pain medicine to control pain, unless another pain medicine was prescribed. If you have chronic liver or kidney disease or ever had a stomach ulcer or GI bleeding, talk with your healthcare provider using these medicines.  · If crutches or a walker have been recommended, do not put weight on the injured leg until you can do so without pain. Check with your healthcare provider before returning to sports or full work duties.  · If you have a hook-and-loop knee brace, you can remove it to bathe and sleep, unless told otherwise.  Follow-up care  Follow up with your healthcare provider as advised. This is usually within 1-2 weeks.  If X-rays were taken, you will be told of any new findings that may affect your care.  Call 911  Call 911 if you have:  · Shortness of breath  · Chest pain  When to seek medical advice  Call your healthcare provider right away if any of these occur:  · Toes or foot becomes swollen, cold, blue, numb, or tingly  · Pain or swelling spreads over the knee or calf  · Warmth or redness appears over the knee or calf  · Other joints become painful  · Rash appears  · Fever of 100.4°F (38°C) or above lasting for 24 to 48 hours  Date Last Reviewed: 11/23/2015  © 6294-8294 [employer]. [address] [place] [address]. All rights reserved. This information is not intended as a substitute for professional medical care. Always follow your healthcare professional's instructions.        Leg Swelling in a Single Leg  Swelling of the arms, feet, ankles, and legs is called edema. It is caused by extra fluid collecting in the tissues. Because  of gravity, extra fluid in the body settles to the lowest part. That is why the legs and feet are most affected. You have swelling in a single leg.  Some of the causes for swelling in only a single leg include:  · Infection in the foot or leg  · Long-term problem with a vein not working well (venous insufficiency)  · Swollen, twisted vein in the leg (varicose veins)  · Insect bite or sting on the foot or leg  · Injury or recent surgery on the foot or leg  · Blood clot in a deep vein of the leg (deep vein thrombosis or DVT)  · Inflammation of the joints of the lower leg  Medical treatment will depend on what is causing your swelling.  Home care  Follow these guidelines when caring for yourself at home:  · Dont wear tight clothing.  · Keep your legs up while lying or sitting.  · Take any medicines as directed.  · If infection, injury, or recent surgery is the cause of your swelling, stay off your legs as much as possible until your symptoms get better.  · If you have venous insufficiency or varicose veins, dont sit or  one place for long periods of time. Take breaks and walk around every few hours. Talk with your healthcare provider about wearing support stockings to help lessen swelling during the day.  · Wear compression stockings with your doctor's approval  Follow-up care  Follow up with your healthcare provider as advised.  Call 911  Call 911 if any of these occur:  · Shortness of breath or trouble breathing  · Chest pain  · Coughing up blood  · Fainting or loss of consciousness   When to seek medical advice  Call your healthcare provider right away if any of these occur:  · Increased pain, swelling, warmth, or redness of the leg, ankle, or foot  · Fever of 100.4°F (38ºC) or higher, or as directed by your healthcare provider  · Weakness or dizziness  · Shaking chills  · Drenching sweats  Date Last Reviewed: 4/11/2016  © 6236-5244 [employer]. [address] [place] [address], [place] [address]. All  rights reserved. This information is not intended as a substitute for professional medical care. Always follow your healthcare professional's instructions.

## 2018-11-25 NOTE — PROGRESS NOTES
"Subjective:       Patient ID: Tahira Jenkins is a 53 y.o. female.    Vitals:    11/25/18 1235   BP: (!) 155/90   Pulse: 96   Resp: 16   Temp: 97.3 °F (36.3 °C)   TempSrc: Oral   SpO2: 98%   Weight: 105.2 kg (232 lb)   Height: 5' 7" (1.702 m)       Chief Complaint: Leg Swelling (Left leg)    Patient reports left leg swelling for 2-3 weeks.Patient states its had to sleep.Patient reports no know trauma.  Patient states that she does have swelling in her legs from time to time however over the last 2-3 weeks the mobility at work has been more painful at the left knee with some swelling to the left lower extremity as well as aches and pains noted in her shoulders and back.  She states that she does not think that she can make it to work tomorrow because of the aches and pains in her leg and knee.  She states that Mobic has helped in the past but it upset her stomach.  She does have a primary care physician.  There is no redness or pitting or palpable cords, negative Homans sign, ambulatory without difficulty and there has been no trauma or fever.      Edema   This is a new problem. Episode onset: 2-3 weeks. The problem occurs constantly. The problem has been gradually worsening. Pertinent negatives include no abdominal pain, chest pain, chills, fever, headaches, nausea, rash, sore throat or vomiting. The symptoms are aggravated by bending, walking and standing. Treatments tried: Mobic last dose 9:30pm  aleve last dose yesterday at 11:00am.     Review of Systems   Constitution: Negative for chills and fever.   HENT: Negative for sore throat.    Eyes: Negative for blurred vision.   Cardiovascular: Negative for chest pain.   Respiratory: Negative for shortness of breath.    Skin: Negative for rash.   Musculoskeletal: Negative for back pain and joint pain.        Swelling of left leg   Gastrointestinal: Negative for abdominal pain, diarrhea, nausea and vomiting.   Neurological: Negative for headaches. "   Psychiatric/Behavioral: The patient is not nervous/anxious.        Objective:      Physical Exam   Constitutional: She is oriented to person, place, and time. Vital signs are normal. She appears well-developed and well-nourished. She is active and cooperative. No distress.   HENT:   Head: Normocephalic and atraumatic.   Nose: Nose normal.   Mouth/Throat: Oropharynx is clear and moist and mucous membranes are normal.   Eyes: Conjunctivae and lids are normal.   Neck: Trachea normal, normal range of motion, full passive range of motion without pain and phonation normal. Neck supple.   Cardiovascular: Normal rate, regular rhythm, normal heart sounds, intact distal pulses and normal pulses.   Pulmonary/Chest: Effort normal and breath sounds normal.   Abdominal: Soft. Normal appearance and bowel sounds are normal. She exhibits no abdominal bruit, no pulsatile midline mass and no mass.   Musculoskeletal: She exhibits edema and tenderness (Mild tenderness to palpation of the left knee anteriorly and along both sides medially and laterally.  No specific posterior knee pain in the popliteal fossa.  No significant calf pain.  The patient does have bilateral lower extremity edema ). She exhibits no deformity.   Bilateral lower extremity edema in the left lower extremity edema is more pronounced than the right.   Neurological: She is alert and oriented to person, place, and time. She has normal strength and normal reflexes. No sensory deficit.   Skin: Skin is warm, dry and intact. She is not diaphoretic.   Psychiatric: She has a normal mood and affect. Her speech is normal and behavior is normal. Cognition and memory are normal.   Nursing note and vitals reviewed.        Er precautions given  Already has mobic  rx tramadol given  uoltrasound ordered for within 48 hours and pcp dr franks/er follow up if having any pushback for the ultrasound  Assessment:       1. Pain and swelling of left lower leg    2. Arthralgia of knee,  left        Plan:       Tahira was seen today for leg swelling.    Diagnoses and all orders for this visit:    Pain and swelling of left lower leg  -     US Lower Extremity Veins Left; Future    Arthralgia of knee, left    Other orders  -     betamethasone acetate-betamethasone sodium phosphate injection 9 mg  -     traMADol (ULTRAM) 50 mg tablet; Take 1 tablet (50 mg total) by mouth every 6 (six) hours as needed for Pain.          Patient Instructions   Rest and ice and elevate the left knee and left lower extremity  Celestone shot 1.5 cc given in clinic for inflammation of the left knee and pain of the left knee  Out of concern of persistent left lower extremity swelling with associated left knee pain without trauma I have ordered a ultrasound of the left lower extremity to rule out DVT.  They will be calling you tomorrow to set up this within the next 24-48 hours.  If you are having any trouble setting this up, please call either here or doctor Smiley's office your primary care physician to help expedite this.  If for whatever reason you are unable to have this performed in the next 48 hr, as we have discussed, please go to the emergency department for ultrasound of the left lower extremity to rule out DVT, blood clot.      Otherwise, continue plan of care with elevation of the left lower extremity, anti-inflammatory meloxicam that you already have at the house with being sure to take with food on your stomach to avoid nausea.     I have also written for you a tramadol prescription for pain not relieved with the meloxicam.    See lower extremity edema sheet  See arthralgia sheet  See knee pain sheet  Go to the ER if worse in any way.    Follow-up with Dr. Smiley were removed next week.  Arthralgia    Arthralgia is the term for pain in or around the joint. It is a symptom, not a disease. This pain may involve one or more joints. In some cases, the pain moves from joint to joint.  There are many causes for joint  pain. These include:  · Injury  · Osteoarthritis (wearing out of the joint surface)  · Gout (inflammation of the joint due to crystals in the joint fluid)  · Infection inside the joint    · Bursitis (inflammation of the fluid-filled sacs around the joint)  · Autoimmune disorders such as rheumatoid arthritis or lupus  · Tendonitis (inflammation of chords that attach muscle to bone)  Home care  · Rest the involved joint(s) until your symptoms improve.   · You may be prescribed pain medicine. If none is prescribed, you may use acetaminophen or ibuprofen to control pain and inflammation.  Follow-up care  Follow up with your healthcare provider or as advised.  When to seek medical advice  Contact your healthcare provider right away if any of the following occurs:  · Pain, swelling, or redness of joint increases  · Pain worsens or recurs after a period of improvement  · Pain moves to other joints  · You cannot bear weight on the affected joint   · You cannot move the affected joint  · Joint appears deformed  · New rash appears  · Fever of 100.4ºF (38ºC) or higher, or as directed by your healthcare provider  Date Last Reviewed: 3/1/2017  © 7089-3632 [employer]. [address] [place] [address]. All rights reserved. This information is not intended as a substitute for professional medical care. Always follow your healthcare professional's instructions.        Knee Pain of Uncertain Cause    There are several common causes for knee pain. These can include:  · A sprain of the ligaments that support the joint  · An injury to the cartilage lining of the joint  · Arthritis from wear-and-tear or inflammation  There are other causes as well. There may also be swelling, reduced movement of the knee joint, and pain with walking. A definite diagnosis will still need to be made. If your symptoms do not improve, further follow-up and testing may be needed.  Home care  · Stay off the injured leg as much as  possible until pain improves.  · Apply an ice pack over the injured area for 15 to 20 minutes every 3 to 6 hours. You should do this for the first 24 to 48 hours. You can make an ice pack by filling a plastic bag that seals at the top with ice cubes and then wrapping it with a thin towel. Continue to use ice packs for relief of pain and swelling as needed. As the ice melts, be careful to avoid getting your wrap, splint, or cast wet. After 48 hours, apply heat (warm shower or warm bath) for 15 to 20 minutes several times a day, or alternate ice and heat. If you have to wear a hook-and-loop knee brace, you can open it to apply the ice pack, or heat, directly to the knee. Never put ice directly on the skin. Always wrap the ice in a towel or other type of cloth.  · You may use over-the-counter pain medicine to control pain, unless another pain medicine was prescribed. If you have chronic liver or kidney disease or ever had a stomach ulcer or GI bleeding, talk with your healthcare provider using these medicines.  · If crutches or a walker have been recommended, do not put weight on the injured leg until you can do so without pain. Check with your healthcare provider before returning to sports or full work duties.  · If you have a hook-and-loop knee brace, you can remove it to bathe and sleep, unless told otherwise.  Follow-up care  Follow up with your healthcare provider as advised. This is usually within 1-2 weeks.  If X-rays were taken, you will be told of any new findings that may affect your care.  Call 911  Call 911 if you have:  · Shortness of breath  · Chest pain  When to seek medical advice  Call your healthcare provider right away if any of these occur:  · Toes or foot becomes swollen, cold, blue, numb, or tingly  · Pain or swelling spreads over the knee or calf  · Warmth or redness appears over the knee or calf  · Other joints become painful  · Rash appears  · Fever of 100.4°F (38°C) or above lasting for 24 to  48 hours  Date Last Reviewed: 11/23/2015  © 1416-4878 [employer]. [address] [place] [address], [place] [address]. All rights reserved. This information is not intended as a substitute for professional medical care. Always follow your healthcare professional's instructions.        Leg Swelling in a Single Leg  Swelling of the arms, feet, ankles, and legs is called edema. It is caused by extra fluid collecting in the tissues. Because of gravity, extra fluid in the body settles to the lowest part. That is why the legs and feet are most affected. You have swelling in a single leg.  Some of the causes for swelling in only a single leg include:  · Infection in the foot or leg  · Long-term problem with a vein not working well (venous insufficiency)  · Swollen, twisted vein in the leg (varicose veins)  · Insect bite or sting on the foot or leg  · Injury or recent surgery on the foot or leg  · Blood clot in a deep vein of the leg (deep vein thrombosis or DVT)  · Inflammation of the joints of the lower leg  Medical treatment will depend on what is causing your swelling.  Home care  Follow these guidelines when caring for yourself at home:  · Dont wear tight clothing.  · Keep your legs up while lying or sitting.  · Take any medicines as directed.  · If infection, injury, or recent surgery is the cause of your swelling, stay off your legs as much as possible until your symptoms get better.  · If you have venous insufficiency or varicose veins, dont sit or  one place for long periods of time. Take breaks and walk around every few hours. Talk with your healthcare provider about wearing support stockings to help lessen swelling during the day.  · Wear compression stockings with your doctor's approval  Follow-up care  Follow up with your healthcare provider as advised.  Call 911  Call 911 if any of these occur:  · Shortness of breath or trouble breathing  · Chest pain  · Coughing up blood  · Fainting or loss of  consciousness   When to seek medical advice  Call your healthcare provider right away if any of these occur:  · Increased pain, swelling, warmth, or redness of the leg, ankle, or foot  · Fever of 100.4°F (38ºC) or higher, or as directed by your healthcare provider  · Weakness or dizziness  · Shaking chills  · Drenching sweats  Date Last Reviewed: 4/11/2016  © 1773-8093 [employer]. [address] [place] [address], [place] [address]. All rights reserved. This information is not intended as a substitute for professional medical care. Always follow your healthcare professional's instructions.

## 2018-11-25 NOTE — LETTER
November 25, 2018      [facility] [address] [place]  [facility] [address]  Phone: [phone]  Fax: [phone]       Patient: Tahira Jenkins   YOB: 1965  Date of Visit: 11/25/2018    To Whom It May Concern:    Joaquín Jenkins  was at [facility] on 11/25/2018. She may return to work/school on 11/28/18 with no restrictions. If you have any questions or concerns, or if I can be of further assistance, please do not hesitate to contact me.    Sincerely,      Prem Shaw MD

## 2018-11-26 ENCOUNTER — HOSPITAL ENCOUNTER (EMERGENCY)
Facility: OTHER | Age: 53
Discharge: HOME | End: 2018-11-26
Attending: EMERGENCY MEDICINE
Payer: COMMERCIAL

## 2018-11-26 VITALS
HEART RATE: 97 BPM | DIASTOLIC BLOOD PRESSURE: 90 MMHG | HEIGHT: 67 IN | TEMPERATURE: 98 F | WEIGHT: 232 LBS | BODY MASS INDEX: 36.41 KG/M2 | OXYGEN SATURATION: 98 % | SYSTOLIC BLOOD PRESSURE: 171 MMHG | RESPIRATION RATE: 18 BRPM

## 2018-11-26 DIAGNOSIS — M71.22 BAKER CYST, LEFT: Primary | ICD-10-CM

## 2018-11-26 DIAGNOSIS — R60.9 SWELLING: ICD-10-CM

## 2018-11-26 PROCEDURE — 99284 EMERGENCY DEPT VISIT MOD MDM: CPT | Mod: 25

## 2018-11-26 RX ORDER — IBUPROFEN 600 MG/1
600 TABLET ORAL EVERY 6 HOURS PRN
Qty: 20 TABLET | Refills: 0 | Status: SHIPPED | OUTPATIENT
Start: 2018-11-26

## 2018-11-26 NOTE — ED PROVIDER NOTES
"Encounter Date: 11/26/2018    SCRIBE #1 NOTE: I, Dann Ruby, am scribing for, and in the presence of, Dr. Luna.       History     Chief Complaint   Patient presents with    Leg Swelling     Pt c/o sarah leg swelling with the more swelling to the left leg and pain behind the left knee the past few weeks. Pt was seen at  yesterday and told to report to the ED for and US.      Seen by provider: 1:08 PM    Patient is a 53 y.o. female who presents to the ED with complaint of left leg swelling for the past few weeks. She reports associated pain located behind the left knee. Pain is worse with movement. She reports difficulty walking secondary to pain. She was seen at urgent care yesterday and states "they told me both legs were swollen." Patient reports elevating her legs yesterday evening with improvement to swelling today, and she has no current leg swelling. She states the instructions she was given from urgent care said that someone would call her today to set up an ultrasound to rule out DVT, however was also told to come to the ED if she had difficulty scheduling an outpatient ultrasound. She did not receive a call today so she came to the ED. She denies any chest pain or shortness of breath. She notes that normally both her feet hurt and become swollen after standing all day doing house work. She has no additional complaints at this time.       The history is provided by the patient.     Review of patient's allergies indicates:   Allergen Reactions    Lisinopril Other (See Comments)     Cough      Lisinopril-hydrochlorothiazide      Cough      Meloxicam Nausea Only    Vivelle [estradiol] Itching     Past Medical History:   Diagnosis Date    Anemia      Past Surgical History:   Procedure Laterality Date    HYSTERECTOMY      HYSTERECTOMY-TOTAL-ABDOMINAL AND BSO Bilateral 12/2/2015    Performed by Myranda Caceres MD at [facility], [place], operated by [facility] OR    VAGINAL DELIVERY      x3     Family History   Problem Relation Age " of Onset    Diabetes Father     No Known Problems Mother     Hypertension Brother     Breast cancer Neg Hx     Colon cancer Neg Hx     Ovarian cancer Neg Hx      Social History     Tobacco Use    Smoking status: Never Smoker    Smokeless tobacco: Never Used   Substance Use Topics    Alcohol use: No     Alcohol/week: 0.0 oz    Drug use: No     Review of Systems   Constitutional: Negative for fever.   HENT: Negative for sore throat.    Respiratory: Negative for shortness of breath.    Cardiovascular: Positive for leg swelling. Negative for chest pain.   Gastrointestinal: Negative for nausea.   Genitourinary: Negative for dysuria.   Musculoskeletal: Positive for arthralgias (left knee pain). Negative for back pain.   Skin: Negative for rash.   Neurological: Negative for weakness.   Hematological: Does not bruise/bleed easily.       Physical Exam     Initial Vitals [11/26/18 1151]   BP Pulse Resp Temp SpO2   (!) 162/78 100 18 98 °F (36.7 °C) 97 %      MAP       --         Physical Exam    Nursing note and vitals reviewed.  Constitutional: She appears well-developed and well-nourished. She is not diaphoretic. No distress.   HENT:   Head: Normocephalic and atraumatic.   Eyes: Conjunctivae and EOM are normal. Pupils are equal, round, and reactive to light.   Neck: Normal range of motion. Neck supple.   Cardiovascular: Normal rate, regular rhythm, normal heart sounds and intact distal pulses. Exam reveals no gallop and no friction rub.    No murmur heard.  Pulmonary/Chest: Breath sounds normal. No respiratory distress. She has no wheezes. She has no rhonchi. She has no rales.   Musculoskeletal: Normal range of motion. She exhibits no edema or tenderness.   Neurological: She is alert and oriented to person, place, and time. She has normal strength. No sensory deficit.   Skin: Skin is warm and dry. Capillary refill takes less than 2 seconds. No erythema.   Psychiatric: She has a normal mood and affect. Her behavior  is normal. Thought content normal.         ED Course   Procedures  Labs Reviewed - No data to display       Imaging Results          US Lower Extremity Veins Left (Final result)  Result time 11/26/18 12:44:31    Final result by Prem Dunlap MD (11/26/18 12:44:31)                 Impression:      No evidence of deep venous thrombosis in the left lower extremity.    Left popliteal fossa 1.8 cm probable Baker's cyst.  Correlate clinically.      Electronically signed by: Prem Dunlap MD  Date:    11/26/2018  Time:    12:44             Narrative:    EXAMINATION:  US LOWER EXTREMITY VEINS LEFT    CLINICAL HISTORY:  Edema, unspecified    TECHNIQUE:  Duplex and color flow Doppler evaluation and graded compression of the left lower extremity veins was performed.    COMPARISON:  None    FINDINGS:  Left thigh veins: The common femoral, femoral, popliteal, upper greater saphenous, and deep femoral veins are patent and free of thrombus. The veins are normally compressible and have normal phasic flow and augmentation response.    Left calf veins: The visualized calf veins are patent.    Contralateral CFV: The contralateral (right) common femoral vein is patent and free of thrombus.    Miscellaneous: There is a small complex fluid collection without internal vascularity at the popliteal fossa measuring 1.4 x 0.6 x 1.8 cm suggestive of a Baker's cyst versus hematoma.  Abscess not entirely excluded but considered less likely.                                 Medical Decision Making:   Clinical Tests:   Radiological Study: Ordered and Reviewed  ED Management:  A 53-year-old female sent over with recommendations from urgent care for left leg swelling. She has also had bilateral leg swelling which improves with elevation.  Ultrasound left leg shows no DVT.  Does show likely Baker cyst.  This is probably the cause of her knee pain. No further workup indicated at this time.  The patient actually feels much better today with minimal  swelling in her lower extremities as compared to yesterday.  Swelling likely secondary to age and venous insufficiency.     Patient discharged home in stable condition. Diagnosis and treatment plan explained to patient. I have answered all questions and the patient is satisfied with the plan of care. The patient demonstrates understanding of the care plan. This is the extent to the patients complaints today here in the emergency department.                Scribe Attestation:   Scribe #1: I performed the above scribed service and the documentation accurately describes the services I performed. I attest to the accuracy of the note.    Attending Attestation:           Physician Attestation for Scribe:  Physician Attestation Statement for Scribe #1: I, Dr. Luna, reviewed documentation, as scribed by Dann Ruby in my presence, and it is both accurate and complete.                    Clinical Impression:     1. Baker cyst, left    2. Swelling                              German Luna, DO  11/26/18 [id number]

## 2018-11-26 NOTE — ED NOTES
Patient Identifiers for Tahira Jenkins checked and correct  Pt reports bilateral lower leg swelling for over a week, saw PCP yesterday and given hydrocortisone shot  LOC: The patient is awake, alert and aware of environment with an appropriate affect, the patient is oriented x 3 and speaking appropriate.  APPEARANCE: Patient resting comfortably and in no acute distress, patient is clean and well groomed, patient's clothing is properly fastened.  SKIN: The skin is warm and dry, patient has normal skin turgor and moist mucus membranes,no rashes or lesions.Skin Intact , No Breakdown Noted  Musculoskeletal :  Normal range of motion noted. Moves all extremeties well, No swelling or tenderness noted  RESPIRATORY: Airway is open and patent, respirations are spontaneous, patient has a normal effort and rate.Bilateral Lungs Sounds are clear  CARDIAC: Patient has a normal rate and rhythm, no periphreal edema noted, capillary refill < 3 seconds.   ABDOMEN: Soft and non tender to palpation, no distention noted.   PULSES: 2+  And symmetrical in all extremeties  NEUROLOGIC: PERRL,  facial expression is symmetrical, patient moving all extremities, normal sensation in all extremities when touched with a finger.The patient is awake, alert and cooperative with a calm affect, patient is aware of environment.    Will continue to monitor

## 2018-11-27 ENCOUNTER — PES CALL (OUTPATIENT)
Dept: ADMINISTRATIVE | Facility: CLINIC | Age: 53
End: 2018-11-27

## 2018-11-29 ENCOUNTER — OFFICE VISIT (OUTPATIENT)
Dept: INTERNAL MEDICINE | Facility: CLINIC | Age: 53
End: 2018-11-29
Attending: FAMILY MEDICINE
Payer: COMMERCIAL

## 2018-11-29 VITALS
BODY MASS INDEX: 36.36 KG/M2 | OXYGEN SATURATION: 99 % | DIASTOLIC BLOOD PRESSURE: 74 MMHG | WEIGHT: 231.69 LBS | HEART RATE: 90 BPM | SYSTOLIC BLOOD PRESSURE: 126 MMHG | HEIGHT: 67 IN

## 2018-11-29 DIAGNOSIS — R60.9 DEPENDENT EDEMA: Primary | ICD-10-CM

## 2018-11-29 DIAGNOSIS — Z12.12 SCREENING FOR COLORECTAL CANCER: ICD-10-CM

## 2018-11-29 DIAGNOSIS — R53.83 FATIGUE, UNSPECIFIED TYPE: ICD-10-CM

## 2018-11-29 DIAGNOSIS — I10 ESSENTIAL HYPERTENSION: ICD-10-CM

## 2018-11-29 DIAGNOSIS — Z12.11 SCREENING FOR COLORECTAL CANCER: ICD-10-CM

## 2018-11-29 DIAGNOSIS — Z12.39 SCREENING FOR BREAST CANCER: ICD-10-CM

## 2018-11-29 PROCEDURE — 99214 OFFICE O/P EST MOD 30 MIN: CPT | Mod: S$GLB,,, | Performed by: FAMILY MEDICINE

## 2018-11-29 PROCEDURE — 99999 PR PBB SHADOW E&M-EST. PATIENT-LVL III: CPT | Mod: PBBFAC,,, | Performed by: FAMILY MEDICINE

## 2018-11-29 RX ORDER — FUROSEMIDE 20 MG/1
20 TABLET ORAL DAILY
Qty: 30 TABLET | Refills: 11 | Status: SHIPPED | OUTPATIENT
Start: 2018-11-29 | End: 2019-04-24

## 2018-11-29 NOTE — PROGRESS NOTES
"CHIEF COMPLAINT:  Lower extremity edema     HISTORY OF PRESENT ILLNESS: The patient is a healthy 53-year-old black female.       Pt has complaints about worsening bilateral calf and foot swelling for the past several days.  She is on HCTZ but it does not appear to be helping. It is worse when standing or doing chores. There is no SOB, no neurological symptoms.      Pt also reports 1-2 months of nocturia. This occurs 4-5 times a night and has made it difficult to sleep. She only sleeps 5-6 hrs and this has exacerbated her fatigue. She has no other difficulty urination, no blood/pain on urination, no change in frequency.     She has HTN and currently on Norvasc for control.      REVIEW OF SYSTEMS:  GENERAL: No fever, chills, fatigability or weight loss.  SKIN: No rashes, itching or changes in color or texture of skin.  HEAD: No headaches or recent head trauma.  EYES: Visual acuity fine. No photophobia, ocular pain or diplopia.  EARS: Denies ear pain, discharge or vertigo.  NOSE: No loss of smell, no epistaxis or postnasal drip.  MOUTH & THROAT: No hoarseness or change in voice. No excessive gum bleeding.  NODES: Denies swollen glands.  CHEST: Denies COSTA, cyanosis, wheezing, cough and sputum production.  CARDIOVASCULAR: Denies chest pain, PND, orthopnea or reduced exercise tolerance.  ABDOMEN: Appetite fine. No weight loss. Denies diarrhea, abdominal pain, hematemesis or blood in stool.  URINARY: No flank pain, dysuria or hematuria.  PERIPHERAL VASCULAR: No claudication or cyanosis.  MUSCULOSKELETAL: No joint stiffness. Denies back pain.  Except as above  NEUROLOGIC: No history of seizures, paralysis, alteration of gait or coordination.     SOCIAL HISTORY: The patient does not smoke.  The patient consumes alcohol socially.  The patient works at [employer].     PHYSICAL EXAMINATION:   Blood pressure 126/74, pulse 90, height 5' 7" (1.702 m), weight 105.1 kg (231 lb 11.3 oz), last menstrual period 11/23/2015, SpO2 99 " %.    APPEARANCE: Well nourished, well developed, in no acute distress.    HEAD: Normocephalic, atraumatic.  EYES: PERRL. EOMI.  Conjunctivae without injection and  anicteric  EARS: TM's intact. Light reflex normal. No retraction or perforation.    NOSE: Mucosa pink. Airway clear.  MOUTH & THROAT: No tonsillar enlargement. No pharyngeal erythema or exudate. No stridor.  NECK: Supple.   NODES: No cervical, axillary or inguinal lymph node enlargement.  CHEST: Lungs clear to auscultation.  No retractions are noted.  No rales or rhonchi are present.  CARDIOVASCULAR: Normal S1, S2. No rubs, murmurs or gallops.  ABDOMEN: Bowel sounds normal. Not distended. Soft. No tenderness or masses.  No ascites is noted.  MUSCULOSKELETAL:  There is no clubbing, cyanosis, or edema of the extremities x4.  There is full range of motion of the lumbar spine.  There is full range of motion of the extremities x4.  There is no deformity noted.    NEUROLOGIC:       Normal speech development.      Hearing normal.      Normal gait.      Motor and sensory exams grossly normal.      DTR's normal.  PSYCHIATRIC: Patient is alert and oriented x3.  Thought processes are all normal.  There is no homicidality.  There is no suicidality.  There is no evidence of psychosis.     LABORATORY/RADIOLOGY:   Chart reviewed.  We will update blood work today.     ASSESSMENT:   Annual  Essential hypertension  Dependent edema: likely SE of Amlodipine  Fatigue, unspecified type: due to nocturia and difficulty sleeping  Nocturia     PLAN:  Discontinue HCTZ and start Lasix  Continue current medications otherwise  Follow-up in about 6 months

## 2018-12-18 ENCOUNTER — TELEPHONE (OUTPATIENT)
Dept: OBSTETRICS AND GYNECOLOGY | Facility: CLINIC | Age: 53
End: 2018-12-18

## 2018-12-18 NOTE — TELEPHONE ENCOUNTER
----- Message from Shai Lee sent at 12/18/2018  8:32 AM CST -----  PLEASE CALL PT SHE NEEDS TO SCHEDULE A APPT [phone]

## 2018-12-23 DIAGNOSIS — Z78.0 MENOPAUSE: ICD-10-CM

## 2018-12-26 ENCOUNTER — HOSPITAL ENCOUNTER (OUTPATIENT)
Dept: RADIOLOGY | Facility: OTHER | Age: 53
Discharge: HOME | End: 2018-12-26
Attending: FAMILY MEDICINE
Payer: COMMERCIAL

## 2018-12-26 VITALS — BODY MASS INDEX: 36.26 KG/M2 | WEIGHT: 231 LBS | HEIGHT: 67 IN

## 2018-12-26 DIAGNOSIS — Z12.39 SCREENING FOR BREAST CANCER: ICD-10-CM

## 2018-12-26 PROCEDURE — 77063 BREAST TOMOSYNTHESIS BI: CPT | Mod: 26,,, | Performed by: RADIOLOGY

## 2018-12-26 PROCEDURE — 77063 BREAST TOMOSYNTHESIS BI: CPT | Mod: TC

## 2018-12-26 PROCEDURE — 77067 SCR MAMMO BI INCL CAD: CPT | Mod: 26,,, | Performed by: RADIOLOGY

## 2018-12-26 RX ORDER — ESTRADIOL 0.05 MG/D
FILM, EXTENDED RELEASE TRANSDERMAL
Qty: 8 PATCH | Refills: 11 | Status: SHIPPED | OUTPATIENT
Start: 2018-12-26 | End: 2019-01-24 | Stop reason: SDUPTHER

## 2019-01-07 RX ORDER — MELOXICAM 15 MG/1
TABLET ORAL
Qty: 30 TABLET | Refills: 3 | Status: SHIPPED | OUTPATIENT
Start: 2019-01-07 | End: 2019-04-24

## 2019-01-24 ENCOUNTER — OFFICE VISIT (OUTPATIENT)
Dept: OBSTETRICS AND GYNECOLOGY | Facility: CLINIC | Age: 54
End: 2019-01-24
Attending: OBSTETRICS & GYNECOLOGY
Payer: COMMERCIAL

## 2019-01-24 ENCOUNTER — CLINICAL SUPPORT (OUTPATIENT)
Dept: OBSTETRICS AND GYNECOLOGY | Facility: CLINIC | Age: 54
End: 2019-01-24
Payer: COMMERCIAL

## 2019-01-24 ENCOUNTER — OFFICE VISIT (OUTPATIENT)
Dept: INTERNAL MEDICINE | Facility: CLINIC | Age: 54
End: 2019-01-24
Attending: FAMILY MEDICINE
Payer: COMMERCIAL

## 2019-01-24 VITALS
DIASTOLIC BLOOD PRESSURE: 86 MMHG | WEIGHT: 234.38 LBS | SYSTOLIC BLOOD PRESSURE: 156 MMHG | BODY MASS INDEX: 36.79 KG/M2 | OXYGEN SATURATION: 98 % | HEART RATE: 96 BPM | HEIGHT: 67 IN

## 2019-01-24 VITALS
BODY MASS INDEX: 36.88 KG/M2 | DIASTOLIC BLOOD PRESSURE: 88 MMHG | HEIGHT: 67 IN | SYSTOLIC BLOOD PRESSURE: 148 MMHG | WEIGHT: 235 LBS

## 2019-01-24 DIAGNOSIS — R60.9 DEPENDENT EDEMA: ICD-10-CM

## 2019-01-24 DIAGNOSIS — Z78.0 MENOPAUSE: Primary | ICD-10-CM

## 2019-01-24 DIAGNOSIS — G89.29 CHRONIC PAIN OF BOTH KNEES: Primary | ICD-10-CM

## 2019-01-24 DIAGNOSIS — I10 ESSENTIAL HYPERTENSION: ICD-10-CM

## 2019-01-24 DIAGNOSIS — M25.562 CHRONIC PAIN OF BOTH KNEES: Primary | ICD-10-CM

## 2019-01-24 DIAGNOSIS — M25.561 CHRONIC PAIN OF BOTH KNEES: Primary | ICD-10-CM

## 2019-01-24 PROCEDURE — 99214 OFFICE O/P EST MOD 30 MIN: CPT | Mod: S$GLB,,, | Performed by: FAMILY MEDICINE

## 2019-01-24 PROCEDURE — 96372 PR INJECTION,THERAP/PROPH/DIAG2ST, IM OR SUBCUT: ICD-10-PCS | Mod: S$GLB,,, | Performed by: OBSTETRICS & GYNECOLOGY

## 2019-01-24 PROCEDURE — 96372 THER/PROPH/DIAG INJ SC/IM: CPT | Mod: S$GLB,,, | Performed by: OBSTETRICS & GYNECOLOGY

## 2019-01-24 PROCEDURE — 99214 PR OFFICE/OUTPT VISIT, EST, LEVL IV, 30-39 MIN: ICD-10-PCS | Mod: S$GLB,,, | Performed by: FAMILY MEDICINE

## 2019-01-24 PROCEDURE — 99213 PR OFFICE/OUTPT VISIT, EST, LEVL III, 20-29 MIN: ICD-10-PCS | Mod: 25,S$GLB,, | Performed by: OBSTETRICS & GYNECOLOGY

## 2019-01-24 PROCEDURE — 99999 PR PBB SHADOW E&M-EST. PATIENT-LVL III: ICD-10-PCS | Mod: PBBFAC,,, | Performed by: FAMILY MEDICINE

## 2019-01-24 PROCEDURE — 99213 OFFICE O/P EST LOW 20 MIN: CPT | Mod: 25,S$GLB,, | Performed by: OBSTETRICS & GYNECOLOGY

## 2019-01-24 PROCEDURE — 99999 PR PBB SHADOW E&M-EST. PATIENT-LVL III: CPT | Mod: PBBFAC,,, | Performed by: FAMILY MEDICINE

## 2019-01-24 RX ORDER — ESTRADIOL 0.05 MG/D
1 FILM, EXTENDED RELEASE TRANSDERMAL
Qty: 24 PATCH | Refills: 3 | Status: SHIPPED | OUTPATIENT
Start: 2019-01-24

## 2019-01-24 RX ORDER — TESTOSTERONE CYPIONATE 1000 MG/10ML
50 INJECTION, SOLUTION INTRAMUSCULAR
Status: DISCONTINUED | OUTPATIENT
Start: 2019-01-24 | End: 2019-02-21

## 2019-01-24 RX ORDER — TRAMADOL HYDROCHLORIDE 50 MG/1
50 TABLET ORAL EVERY 6 HOURS PRN
Qty: 20 TABLET | Refills: 0 | Status: SHIPPED | OUTPATIENT
Start: 2019-01-24 | End: 2019-05-24 | Stop reason: SDUPTHER

## 2019-01-24 RX ADMIN — TESTOSTERONE CYPIONATE 50 MG: 1000 INJECTION, SOLUTION INTRAMUSCULAR at 11:01

## 2019-01-24 NOTE — PROGRESS NOTES
Subjective:       Patient ID: Tahira Jenkins is a 53 y.o. female.    Chief Complaint:  Follow-up      History of Present Illness  HPI  This 53 yr old female is here for restarting on HRT.  She had used injections and did well in the past and even did well on patches.  I last saw her in summer 2017.  She has not taken any HRT for about yr and was doing fairly well but is now having return of aches and pains struggles to get ready for work. Gets better during day. She is also having vaginal dryness but not sexually active.  She desires to get injection today .  She had normal mammogram and low risk in Dec 2018.  She has no contraindications so will go back to the previous plan that worked very well for her.    Patient's last menstrual period was 2015.   Date of Last Pap: 2015    OB History    Para Term  AB Living   5 4 4   1     SAB TAB Ectopic Multiple Live Births   1              # Outcome Date GA Lbr Abiel/2nd Weight Sex Delivery Anes PTL Lv   5 Term            4 Term            3 Term            2 Term            1 SAB                   Review of Systems  Review of Systems   Constitutional: Negative for chills and fever.   Respiratory: Negative for shortness of breath.    Cardiovascular: Negative for chest pain.   Gastrointestinal: Negative for abdominal pain, nausea and vomiting.   Genitourinary: Negative for difficulty urinating, dyspareunia, genital sores, menstrual problem, pelvic pain, vaginal bleeding, vaginal discharge and vaginal pain.   Musculoskeletal: Positive for arthralgias and neck pain.   Skin: Negative for wound.   Hematological: Negative for adenopathy.           Objective:   Physical Exam       Assessment:        1. Menopause               Plan:      Restart vivelle 0.05   (0.1 was too much) and testosterone IM 50 monthly  Follow up yearly

## 2019-01-24 NOTE — PROGRESS NOTES
Here for  hormone therapy injection, no complaints at this time , Injection given as ordered, tolerated well, no report of pain prior to or after injection. Return to clinic as scheduled.      Site - LB     Testosterone  50 mg

## 2019-01-24 NOTE — PROGRESS NOTES
"CHIEF COMPLAINT:  Follow-up lower extremity edema and knee pain     HISTORY OF PRESENT ILLNESS: The patient is a healthy 53-year-old black female.       Pt has complaints about worsening bilateral calf and foot swelling that resolved with furosemide. There is no SOB, no neurological symptoms.  Since the edema has resolved she has noted bilateral knee pain with effusions.  I suspect she has osteoarthritis.     She has HTN and currently on Norvasc for control.      REVIEW OF SYSTEMS:  GENERAL: No fever, chills, fatigability or weight loss.  SKIN: No rashes, itching or changes in color or texture of skin.  HEAD: No headaches or recent head trauma.  EYES: Visual acuity fine. No photophobia, ocular pain or diplopia.  EARS: Denies ear pain, discharge or vertigo.  NOSE: No loss of smell, no epistaxis or postnasal drip.  MOUTH & THROAT: No hoarseness or change in voice. No excessive gum bleeding.  NODES: Denies swollen glands.  CHEST: Denies COSTA, cyanosis, wheezing, cough and sputum production.  CARDIOVASCULAR: Denies chest pain, PND, orthopnea or reduced exercise tolerance.  ABDOMEN: Appetite fine. No weight loss. Denies diarrhea, abdominal pain, hematemesis or blood in stool.  URINARY: No flank pain, dysuria or hematuria.  PERIPHERAL VASCULAR: No claudication or cyanosis.  MUSCULOSKELETAL: No joint stiffness. Denies back pain.  Except as above  NEUROLOGIC: No history of seizures, paralysis, alteration of gait or coordination.     SOCIAL HISTORY: The patient does not smoke.  The patient consumes alcohol socially.  The patient works at [employer].     PHYSICAL EXAMINATION:   Blood pressure (!) 156/86, pulse 96, height 5' 7" (1.702 m), weight 106.3 kg (234 lb 5.6 oz), last menstrual period 11/23/2015, SpO2 98 %.    APPEARANCE: Well nourished, well developed, in no acute distress.    HEAD: Normocephalic, atraumatic.  EYES: PERRL. EOMI.  Conjunctivae without injection and  anicteric  EARS: TM's intact. Light reflex normal. No " retraction or perforation.    NOSE: Mucosa pink. Airway clear.  MOUTH & THROAT: No tonsillar enlargement. No pharyngeal erythema or exudate. No stridor.  NECK: Supple.   NODES: No cervical, axillary or inguinal lymph node enlargement.  CHEST: Lungs clear to auscultation.  No retractions are noted.  No rales or rhonchi are present.  CARDIOVASCULAR: Normal S1, S2. No rubs, murmurs or gallops.  ABDOMEN: Bowel sounds normal. Not distended. Soft. No tenderness or masses.  No ascites is noted.  MUSCULOSKELETAL:  There is no clubbing, cyanosis, or edema of the extremities x4.  There is full range of motion of the lumbar spine.  There is full range of motion of the extremities x4.  There is no deformity noted.    NEUROLOGIC:       Normal speech development.      Hearing normal.      Normal gait.      Motor and sensory exams grossly normal.      DTR's normal.  PSYCHIATRIC: Patient is alert and oriented x3.  Thought processes are all normal.  There is no homicidality.  There is no suicidality.  There is no evidence of psychosis.     LABORATORY/RADIOLOGY:   Chart reviewed.  We will update blood work today.     ASSESSMENT:   Oedema resolved on Lasix  Bilateral knee pain with effusion  Essential hypertension  Dependent edema: likely SE of Amlodipine  Fatigue, unspecified type: due to nocturia and difficulty sleeping  Nocturia     PLAN:  Lasix  Continue current medications otherwise  Follow-up in about 6 months    Sport medicine referral

## 2019-01-29 ENCOUNTER — OFFICE VISIT (OUTPATIENT)
Dept: SPORTS MEDICINE | Facility: CLINIC | Age: 54
End: 2019-01-29
Payer: COMMERCIAL

## 2019-01-29 ENCOUNTER — HOSPITAL ENCOUNTER (OUTPATIENT)
Dept: RADIOLOGY | Facility: HOSPITAL | Age: 54
Discharge: HOME | End: 2019-01-29
Attending: PHYSICIAN ASSISTANT
Payer: COMMERCIAL

## 2019-01-29 VITALS
DIASTOLIC BLOOD PRESSURE: 96 MMHG | BODY MASS INDEX: 36.73 KG/M2 | HEIGHT: 67 IN | SYSTOLIC BLOOD PRESSURE: 159 MMHG | WEIGHT: 234 LBS | HEART RATE: 89 BPM

## 2019-01-29 DIAGNOSIS — M25.561 PAIN IN BOTH KNEES, UNSPECIFIED CHRONICITY: Primary | ICD-10-CM

## 2019-01-29 DIAGNOSIS — M25.562 PAIN IN BOTH KNEES, UNSPECIFIED CHRONICITY: Primary | ICD-10-CM

## 2019-01-29 DIAGNOSIS — M25.561 PAIN IN BOTH KNEES, UNSPECIFIED CHRONICITY: ICD-10-CM

## 2019-01-29 DIAGNOSIS — M25.562 PAIN IN BOTH KNEES, UNSPECIFIED CHRONICITY: ICD-10-CM

## 2019-01-29 PROCEDURE — 99203 PR OFFICE/OUTPT VISIT, NEW, LEVL III, 30-44 MIN: ICD-10-PCS | Mod: S$GLB,,, | Performed by: PHYSICIAN ASSISTANT

## 2019-01-29 PROCEDURE — 73564 X-RAY EXAM KNEE 4 OR MORE: CPT | Mod: TC,50,FY,PO

## 2019-01-29 PROCEDURE — 73564 X-RAY EXAM KNEE 4 OR MORE: CPT | Mod: 26,50,, | Performed by: RADIOLOGY

## 2019-01-29 PROCEDURE — 99203 OFFICE O/P NEW LOW 30 MIN: CPT | Mod: S$GLB,,, | Performed by: PHYSICIAN ASSISTANT

## 2019-01-29 PROCEDURE — 99999 PR PBB SHADOW E&M-EST. PATIENT-LVL III: CPT | Mod: PBBFAC,,, | Performed by: PHYSICIAN ASSISTANT

## 2019-01-29 PROCEDURE — 73564 XR KNEE ORTHO BILAT WITH FLEXION: ICD-10-PCS | Mod: 26,50,, | Performed by: RADIOLOGY

## 2019-01-29 PROCEDURE — 99999 PR PBB SHADOW E&M-EST. PATIENT-LVL III: ICD-10-PCS | Mod: PBBFAC,,, | Performed by: PHYSICIAN ASSISTANT

## 2019-01-29 NOTE — LETTER
January 29, 2019      Arthur Smiley MD  [address]  [facility] [address]           [facility]  [address]  [facility] [address]  Phone: [phone]          Patient: Tahira Jenkins   MR Number: [id number]   YOB: 1965   Date of Visit: 1/29/2019       Dear Dr. Arthur Smiley:    Thank you for referring Tahira Jenkins to me for evaluation. Attached you will find relevant portions of my assessment and plan of care.    If you have questions, please do not hesitate to call me. I look forward to following Tahira Jenkins along with you.    Sincerely,    Andreia Burton PA-C    Enclosure  CC:  No Recipients    If you would like to receive this communication electronically, please contact [email or web address] or [phone] to request more information on [employer] access.    For providers and/or their staff who would like to refer a patient to [facility], please contact us through our one-stop-shop provider referral line, [facility], at [phone].    If you feel you have received this communication in error or would no longer like to receive these types of communications, please e-mail [email or web address]

## 2019-01-29 NOTE — PROGRESS NOTES
CC: Bilateral (Left>right) knee pain    53 y.o. Female with a history of bilateral knee pain x November 2018. She reports swelling and weakness causing difficulty lifting her legs. No injury or trauma. Pain came on gradually. She went to urgent care who sent her to the ED to get an US of her left calf to r/o DVT. Neg for DVT but showed bakers cyst.  She states that the pain is severe and not responding to any conservative care.  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 Pain is most significant in the morning getting out of bed. No prior knee surgery or injury.    She reports that the pain and weakness. It also bothers her at night.    + mechanical symptoms, + instability    Is affecting ADLs.  Pain is 7/10 at it's worst. 5/10 right now.    REVIEW OF SYSTEMS:  Constitution: Negative. Negative for chills, fever and night sweats.   HENT: Negative for congestion and headaches.    Eyes: Negative for blurred vision, left vision loss and right vision loss.   Cardiovascular: Negative for chest pain and syncope.   Respiratory: Negative for cough and shortness of breath.    Endocrine: Negative for polydipsia, polyphagia and polyuria.   Hematologic/Lymphatic: Negative for bleeding problem. Does not bruise/bleed easily.   Skin: Negative for dry skin, itching and rash.   Musculoskeletal: Negative for falls. Positive for left knee pain and  muscle weakness.   Gastrointestinal: Negative for abdominal pain and bowel incontinence.   Genitourinary: Negative for bladder incontinence and nocturia.   Neurological: Negative for disturbances in coordination, loss of balance and seizures.   Psychiatric/Behavioral: Negative for depression. The patient does not have insomnia.    Allergic/Immunologic: Negative for hives and persistent infections.     PAST MEDICAL HISTORY:    Past Medical History:   Diagnosis Date    Anemia        PAST SURGICAL HISTORY:   Past Surgical History:   Procedure Laterality Date    HYSTERECTOMY       HYSTERECTOMY-TOTAL-ABDOMINAL AND BSO Bilateral 12/2/2015    Performed [facility] at [facility]    VAGINAL DELIVERY      x3       FAMILY HISTORY:   Family History   Problem Relation Age of Onset    Diabetes Father     No Known Problems Mother     Hypertension Brother     Breast cancer Neg Hx     Colon cancer Neg Hx     Ovarian cancer Neg Hx        SOCIAL HISTORY:   Social History     Socioeconomic History    Marital status: Single     Spouse name: Not on file    Number of children: Not on file    Years of education: Not on file    Highest education level: Not on file   Social Needs    Financial resource strain: Not on file    Food insecurity - worry: Not on file    Food insecurity - inability: Not on file    Transportation needs - medical: Not on file    Transportation needs - non-medical: Not on file   Occupational History    Not on file   Tobacco Use    Smoking status: Never Smoker    Smokeless tobacco: Never Used   Substance and Sexual Activity    Alcohol use: No     Alcohol/week: 0.0 oz    Drug use: No    Sexual activity: Not Currently   Other Topics Concern    Not on file   Social History Narrative    Not on file       MEDICATIONS:     Current Outpatient Medications:     amLODIPine (NORVASC) 10 MG tablet, TAKE ONE TABLET BY MOUTH ONCE DAILY, Disp: 90 tablet, Rfl: 3    b complex vitamins capsule, Take 1 capsule by mouth once daily., Disp: 30 capsule, Rfl: 3    estradiol 0.05 mg/24 hr td ptsw (VIVELLE-DOT) 0.05 mg/24 hr, Place 1 patch onto the skin twice a week., Disp: 24 patch, Rfl: 3    ferrous gluconate (FERGON) 324 MG tablet, Take 324 mg by mouth daily with breakfast., Disp: , Rfl:     furosemide (LASIX) 20 MG tablet, Take 1 tablet (20 mg total) by mouth once daily., Disp: 30 tablet, Rfl: 11    ibuprofen (ADVIL,MOTRIN) 600 MG tablet, Take 1 tablet (600 mg total) by mouth every 6 (six) hours as needed for Pain., Disp: 20 tablet, Rfl: 0    meloxicam (MOBIC) 15 MG tablet,  "TAKE ONE TABLET BY MOUTH ONCE DAILY, Disp: 30 tablet, Rfl: 3    multivitamin capsule, Take 1 capsule by mouth once daily., Disp: , Rfl:     oxybutynin (DITROPAN) 5 MG Tab, Take 1 tablet (5 mg total) by mouth 2 (two) times daily., Disp: 60 tablet, Rfl: 11    traMADol (ULTRAM) 50 mg tablet, Take 1 tablet (50 mg total) by mouth every 6 (six) hours as needed for Pain., Disp: 20 tablet, Rfl: 0    Current Facility-Administered Medications:     testosterone cypionate injection 50 mg, 50 mg, Intramuscular, Q30 Days, Lizz Aden MD, 50 mg at 01/24/19 1125    ALLERGIES:   Review of patient's allergies indicates:   Allergen Reactions    Lisinopril Other (See Comments)     Cough      Lisinopril-hydrochlorothiazide      Cough      Meloxicam Nausea Only    Vivelle [estradiol] Itching       VITAL SIGNS:   BP (!) 159/96   Pulse 89   Ht 5' 7" (1.702 m)   Wt 106.1 kg (234 lb)   LMP 11/23/2015   BMI 36.65 kg/m²      PHYSICAL EXAMINATION  General:  The patient is alert and oriented x 3.  Mood is pleasant.  Observation of ears, eyes and nose reveal no gross abnormalities.  No labored breathing observed.    LEFT KNEE EXAMINATION     OBSERVATION / INSPECTION   Gait:   antalgic   Alignment:  Neutral   Scars:   None   Muscle atrophy: Mild  Effusion:  None   Warmth:  None   Discoloration:   none     TENDERNESS / CREPITUS (T / C):          T / C      T / C   Patella   - / -   Lateral joint line   - / -   Peripatellar medial  -  Medial joint line    + / - (left only)   Peripatellar lateral -  Medial plica   - / -   Patellar tendon -   Popliteal fossa   - / -   Quad tendon   -   Gastrocnemius   -   Prepatellar Bursa - / -   Quadricep   -   Tibial tubercle  -  Thigh/hamstring  -   Pes anserine/HS -  Fibula    -   ITB   - / -  Tibia     -   Tib/fib joint  - / -  LCL    -     MFC   - / -   MCL: Proximal  -    LFC   - / -    Distal   -          ROM: (* = pain)  PASSIVE   ACTIVE    Left :   0 / 0 / 125   0 / 0 / 125     Right " :    0 / 0 / 135   0 / 0 / 135    Patellofemoral examination:  See above noted areas of tenderness.   Patella position    Subluxation / dislocation: Centered           Sup. / Inf;   Normal   Crepitus (PF):    Absent   Patellar Mobility:       Medial-lateral:   Normal    Superior-inferior:  Normal    Inferior tilt   Normal    Patellar tendon:  Normal   Lateral tilt:    +    MENISCAL SIGNS:     Pain on terminal extension:  -  Pain on terminal flexion:  -  Shannons maneuver:  + (for medial pain left knee only)  Squat     + (for diffuse pain b/l)    LIGAMENT EXAMINATION:  ACL / Lachman:  normal (-1 to 2mm)    PCL-Post.  drawer: normal 0 to 2mm  MCL- Valgus:  normal 0 to 2mm  LCL- Varus:  normal 0 to 2mm    STRENGTH: (* = with pain) BILATERAL   Quadricep   4*/5   Hamstrin*/5    EXTREMITY NEURO-VASCULAR EXAMINATION:   Sensation:  Grossly intact to light touch all dermatomal regions.   Motor Function:  Fully intact motor function at hip, knee, foot and ankle    DTRs;  quadriceps and  achilles 2+.  No clonus and downgoing Babinski.    Vascular status:  DP and PT pulses 2+, brisk capillary refill, symmetric.     IMAGING:     X-rays 19 including standing, weight bearing AP and flexion bilateral knees, lateral and merchant views ordered and images reviewed by me show:    No fracture, dislocation or other pathology   Medial compartment: no degenerative changes   Lateral compartment: no degenerative changes   Patellofemoral compartment: mild degenerative changes     ASSESSMENT:    Left Knee Pain, possible medial meniscus tear, PF chondromalacia    PLAN:   1. MRI Left knee  2. 3D knee sleeve applied by Mariano  3. Start mobic (patient says she is not allergic she just needs to eat when she takes it)  4. RTC to review MRI results and discuss further management options    All questions were answered, pt will contact us for questions or concerns in the interim.

## 2019-01-31 ENCOUNTER — HOSPITAL ENCOUNTER (OUTPATIENT)
Dept: RADIOLOGY | Facility: HOSPITAL | Age: 54
Discharge: HOME | End: 2019-01-31
Attending: PHYSICIAN ASSISTANT
Payer: COMMERCIAL

## 2019-01-31 DIAGNOSIS — M25.561 PAIN IN BOTH KNEES, UNSPECIFIED CHRONICITY: ICD-10-CM

## 2019-01-31 DIAGNOSIS — M25.562 PAIN IN BOTH KNEES, UNSPECIFIED CHRONICITY: ICD-10-CM

## 2019-01-31 PROCEDURE — 73721 MRI JNT OF LWR EXTRE W/O DYE: CPT | Mod: 26,LT,, | Performed by: RADIOLOGY

## 2019-01-31 PROCEDURE — 73721 MRI JNT OF LWR EXTRE W/O DYE: CPT | Mod: TC,LT

## 2019-01-31 PROCEDURE — 73721 MRI KNEE WITHOUT CONTRAST LEFT: ICD-10-PCS | Mod: 26,LT,, | Performed by: RADIOLOGY

## 2019-02-04 ENCOUNTER — OFFICE VISIT (OUTPATIENT)
Dept: SPORTS MEDICINE | Facility: CLINIC | Age: 54
End: 2019-02-04
Payer: COMMERCIAL

## 2019-02-04 VITALS
HEART RATE: 89 BPM | HEIGHT: 67 IN | DIASTOLIC BLOOD PRESSURE: 91 MMHG | BODY MASS INDEX: 36.73 KG/M2 | WEIGHT: 234 LBS | SYSTOLIC BLOOD PRESSURE: 167 MMHG

## 2019-02-04 DIAGNOSIS — S83.242D TEAR OF MEDIAL MENISCUS OF LEFT KNEE, UNSPECIFIED TEAR TYPE, UNSPECIFIED WHETHER OLD OR CURRENT TEAR, SUBSEQUENT ENCOUNTER: Primary | ICD-10-CM

## 2019-02-04 DIAGNOSIS — M22.42 CHONDROMALACIA OF LEFT PATELLA: ICD-10-CM

## 2019-02-04 DIAGNOSIS — M23.201 OLD TEAR OF LATERAL MENISCUS OF LEFT KNEE, UNSPECIFIED TEAR TYPE: ICD-10-CM

## 2019-02-04 PROCEDURE — 99214 OFFICE O/P EST MOD 30 MIN: CPT | Mod: 25,S$GLB,, | Performed by: PHYSICIAN ASSISTANT

## 2019-02-04 PROCEDURE — 20610 PR DRAIN/INJECT LARGE JOINT/BURSA: ICD-10-PCS | Mod: LT,S$GLB,, | Performed by: PHYSICIAN ASSISTANT

## 2019-02-04 PROCEDURE — 20610 DRAIN/INJ JOINT/BURSA W/O US: CPT | Mod: LT,S$GLB,, | Performed by: PHYSICIAN ASSISTANT

## 2019-02-04 PROCEDURE — 99999 PR PBB SHADOW E&M-EST. PATIENT-LVL IV: ICD-10-PCS | Mod: PBBFAC,,, | Performed by: PHYSICIAN ASSISTANT

## 2019-02-04 PROCEDURE — 99214 PR OFFICE/OUTPT VISIT, EST, LEVL IV, 30-39 MIN: ICD-10-PCS | Mod: 25,S$GLB,, | Performed by: PHYSICIAN ASSISTANT

## 2019-02-04 PROCEDURE — 99999 PR PBB SHADOW E&M-EST. PATIENT-LVL IV: CPT | Mod: PBBFAC,,, | Performed by: PHYSICIAN ASSISTANT

## 2019-02-04 NOTE — PROGRESS NOTES
CC: Bilateral (Left>right) knee pain    53 y.o. Female with a history of bilateral knee pain x November 2018. She reports swelling and weakness causing difficulty lifting her legs. No injury or trauma. Pain came on gradually. She went to urgent care who sent her to the ED to get an US of her left calf to r/o DVT. Neg for DVT but showed bakers cyst.  She states that the pain is severe and not responding to any conservative care.  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 No prior knee surgery or injury. She is here today to review her MRI results.    She reports that the pain and weakness. It also bothers her at night.    + mechanical symptoms, + instability    Is affecting ADLs.  Pain is 7/10 at it's worst. 5/10 right now.    REVIEW OF SYSTEMS:  Constitution: Negative. Negative for chills, fever and night sweats.   HENT: Negative for congestion and headaches.    Eyes: Negative for blurred vision, left vision loss and right vision loss.   Cardiovascular: Negative for chest pain and syncope.   Respiratory: Negative for cough and shortness of breath.    Endocrine: Negative for polydipsia, polyphagia and polyuria.   Hematologic/Lymphatic: Negative for bleeding problem. Does not bruise/bleed easily.   Skin: Negative for dry skin, itching and rash.   Musculoskeletal: Negative for falls. Positive for left knee pain and  muscle weakness.   Gastrointestinal: Negative for abdominal pain and bowel incontinence.   Genitourinary: Negative for bladder incontinence and nocturia.   Neurological: Negative for disturbances in coordination, loss of balance and seizures.   Psychiatric/Behavioral: Negative for depression. The patient does not have insomnia.    Allergic/Immunologic: Negative for hives and persistent infections.     PAST MEDICAL HISTORY:    Past Medical History:   Diagnosis Date    Anemia        PAST SURGICAL HISTORY:   Past Surgical History:   Procedure  Laterality Date    HYSTERECTOMY      HYSTERECTOMY-TOTAL-ABDOMINAL AND BSO Bilateral 12/2/2015    [facility]    VAGINAL DELIVERY      x3       FAMILY HISTORY:   Family History   Problem Relation Age of Onset    Diabetes Father     No Known Problems Mother     Hypertension Brother     Breast cancer Neg Hx     Colon cancer Neg Hx     Ovarian cancer Neg Hx        SOCIAL HISTORY:   Social History     Socioeconomic History    Marital status: Single     Spouse name: Not on file    Number of children: Not on file    Years of education: Not on file    Highest education level: Not on file   Social Needs    Financial resource strain: Not on file    Food insecurity - worry: Not on file    Food insecurity - inability: Not on file    Transportation needs - medical: Not on file    Transportation needs - non-medical: Not on file   Occupational History    Not on file   Tobacco Use    Smoking status: Never Smoker    Smokeless tobacco: Never Used   Substance and Sexual Activity    Alcohol use: No     Alcohol/week: 0.0 oz    Drug use: No    Sexual activity: Not Currently   Other Topics Concern    Not on file   Social History Narrative    Not on file       MEDICATIONS:     Current Outpatient Medications:     amLODIPine (NORVASC) 10 MG tablet, TAKE ONE TABLET BY MOUTH ONCE DAILY, Disp: 90 tablet, Rfl: 3    b complex vitamins capsule, Take 1 capsule by mouth once daily., Disp: 30 capsule, Rfl: 3    estradiol 0.05 mg/24 hr td ptsw (VIVELLE-DOT) 0.05 mg/24 hr, Place 1 patch onto the skin twice a week., Disp: 24 patch, Rfl: 3    ferrous gluconate (FERGON) 324 MG tablet, Take 324 mg by mouth daily with breakfast., Disp: , Rfl:     furosemide (LASIX) 20 MG tablet, Take 1 tablet (20 mg total) by mouth once daily., Disp: 30 tablet, Rfl: 11    ibuprofen (ADVIL,MOTRIN) 600 MG tablet, Take 1 tablet (600 mg total) by mouth every 6 (six) hours as needed for Pain., Disp: 20 tablet, Rfl:  "0    meloxicam (MOBIC) 15 MG tablet, TAKE ONE TABLET BY MOUTH ONCE DAILY, Disp: 30 tablet, Rfl: 3    multivitamin capsule, Take 1 capsule by mouth once daily., Disp: , Rfl:     oxybutynin (DITROPAN) 5 MG Tab, Take 1 tablet (5 mg total) by mouth 2 (two) times daily., Disp: 60 tablet, Rfl: 11    traMADol (ULTRAM) 50 mg tablet, Take 1 tablet (50 mg total) by mouth every 6 (six) hours as needed for Pain., Disp: 20 tablet, Rfl: 0    Current Facility-Administered Medications:     testosterone cypionate injection 50 mg, 50 mg, Intramuscular, Q30 Days, Lizz Aden MD, 50 mg at 01/24/19 1125    ALLERGIES:   Review of patient's allergies indicates:   Allergen Reactions    Lisinopril Other (See Comments)     Cough      Lisinopril-hydrochlorothiazide      Cough      Meloxicam Nausea Only    Vivelle [estradiol] Itching       VITAL SIGNS:   BP (!) 167/91   Pulse 89   Ht 5' 7" (1.702 m)   Wt 106.1 kg (234 lb)   LMP 11/23/2015   BMI 36.65 kg/m²      PHYSICAL EXAMINATION  General:  The patient is alert and oriented x 3.  Mood is pleasant.  Observation of ears, eyes and nose reveal no gross abnormalities.  No labored breathing observed.    LEFT KNEE EXAMINATION     OBSERVATION / INSPECTION   Gait:   antalgic   Alignment:  Neutral   Scars:   None   Muscle atrophy: Mild  Effusion:  None   Warmth:  None   Discoloration:   none     TENDERNESS / CREPITUS (T / C):          T / C      T / C   Patella   - / -   Lateral joint line   - / -   Peripatellar medial  -  Medial joint line    + / - (left only)   Peripatellar lateral -  Medial plica   - / -   Patellar tendon -   Popliteal fossa   - / -   Quad tendon   -   Gastrocnemius   -   Prepatellar Bursa - / -   Quadricep   -   Tibial tubercle  -  Thigh/hamstring  -   Pes anserine/HS -  Fibula    -   ITB   - / -  Tibia     -   Tib/fib joint  - / -  LCL    -     MFC   - / -   MCL: Proximal  -    LFC   - / -    Distal   -          ROM: (* = pain)  PASSIVE   ACTIVE    Left " :   0 / 0 / 125   0 / 0 / 125     Right :    0 / 0 / 135   0 / 0 / 135    Patellofemoral examination:  See above noted areas of tenderness.   Patella position    Subluxation / dislocation: Centered           Sup. / Inf;   Normal   Crepitus (PF):    Absent   Patellar Mobility:       Medial-lateral:   Normal    Superior-inferior:  Normal    Inferior tilt   Normal    Patellar tendon:  Normal   Lateral tilt:    +    MENISCAL SIGNS:     Pain on terminal extension:  -  Pain on terminal flexion:  -  Shannons maneuver:  + (for medial pain left knee only)  Squat     + (for diffuse pain b/l)    LIGAMENT EXAMINATION:  ACL / Lachman:  normal (-1 to 2mm)    PCL-Post.  drawer: normal 0 to 2mm  MCL- Valgus:  normal 0 to 2mm  LCL- Varus:  normal 0 to 2mm    STRENGTH: (* = with pain) BILATERAL   Quadricep   4*/5   Hamstrin*/5    EXTREMITY NEURO-VASCULAR EXAMINATION:   Sensation:  Grossly intact to light touch all dermatomal regions.   Motor Function:  Fully intact motor function at hip, knee, foot and ankle    DTRs;  quadriceps and  achilles 2+.  No clonus and downgoing Babinski.    Vascular status:  DP and PT pulses 2+, brisk capillary refill, symmetric.     IMAGING:     X-rays 19 including standing, weight bearing AP and flexion bilateral knees, lateral and merchant views ordered and images reviewed by me show:    No fracture, dislocation or other pathology   Medial compartment: no degenerative changes   Lateral compartment: no degenerative changes   Patellofemoral compartment: mild degenerative changes     MRI LEFT KNEE 19    FINDINGS:  Menisci:  Mucoid signal with vertical linear component at the peripheral third of the posterior horn medial meniscus-body segment junction.  Vertical signal extends the tibial articular surface on single slice (coronal PD fat-sat series 8, image 14), not definitive for tear.  There is globular intrameniscal signal of the posterior horn lateral meniscus with inferior extension  to the tibial articular surface compatible with fraying.    Ligaments:  ACL, PCL, and LCL complex structures in tact.  Intrasubstance T2 signal with periligamentous edema of the proximal third of the MCL.    Tendons:  Extensor mechanism is maintained.    Cartilage:    Patellofemoral: High-grade partial to full thickness loss along the lateral patellar facet with scattered subchondral edema.  Full-thickness fissuring of the adjacent lateral trochlea identified without associated subchondral edema.    Medial tibiofemoral: Articular cartilage is maintained.    Lateral tibiofemoral: Articular cartilage is maintained.    Bone: No fracture or marrow replacing process.    Miscellaneous: Mild knee joint effusion.  Focal edema at the superolateral aspect of Hoffa's fat.  Tiny popliteal cyst.    Impression:  Mucoid degenerative signal with possible vertical tear involving the peripheral third of the medial meniscus posterior horn-body segment junction.    Mucoid degeneration and undersurface fraying of the posterior horn lateral meniscus.    Intrasubstance and perilgiamentous edema at the proximal third of the MCL, possible sequela of resolving/remote injury in the absence of acute trauma.    Advanced lateral patellar chondromalacia.    Mild knee joint effusion with associated synovitis.  Small popliteal cyst.    ASSESSMENT:    Left Knee Pain, degenerative tears lateral and medial meniscus, PF chondromalacia    PLAN:   1. Continue mobic  2. Start PT at [facility]  3. CSI left knee  4. RTC in 6 weeks for follow up    Injection Procedure  A time out was performed, including verification of patient ID, procedure, site and side, availability of information and equipment, review of safety issues, and agreement with consent, the procedure site was marked.    After time out was performed, the patient was prepped aseptically with povidone-iodine swabsticks. A diagnostic and therapeutic injection of 3:1cc Marcaine:Kenalog was  given under sterile technique using a 22.5 gauge needle from the Superolateral aspect of the left Knee Joint in the supine position.      Tahira Jenkins had no adverse reactions to the medication. Pain decreased. She was instructed to apply ice to the joint for 20 minutes and avoid strenuous activities for 24-36 hours following the injection. She was warned of possible blood sugar and/or blood pressure changes during that time. Following that time, she can resume regular activities.    She was reminded to call the clinic immediately for any adverse side effects as explained in clinic today.      All questions were answered, pt will contact us for questions or concerns in the interim.

## 2019-02-06 RX ORDER — TRIAMCINOLONE ACETONIDE 40 MG/ML
40 INJECTION, SUSPENSION INTRA-ARTICULAR; INTRAMUSCULAR
Status: COMPLETED | OUTPATIENT
Start: 2019-02-06 | End: 2019-02-06

## 2019-02-06 RX ORDER — BUPIVACAINE HYDROCHLORIDE 2.5 MG/ML
3 INJECTION, SOLUTION INFILTRATION; PERINEURAL
Status: COMPLETED | OUTPATIENT
Start: 2019-02-06 | End: 2019-02-06

## 2019-02-06 RX ADMIN — TRIAMCINOLONE ACETONIDE 40 MG: 40 INJECTION, SUSPENSION INTRA-ARTICULAR; INTRAMUSCULAR at 12:02

## 2019-02-06 RX ADMIN — BUPIVACAINE HYDROCHLORIDE 7.5 MG: 2.5 INJECTION, SOLUTION INFILTRATION; PERINEURAL at 12:02

## 2019-02-12 ENCOUNTER — CLINICAL SUPPORT (OUTPATIENT)
Dept: REHABILITATION | Facility: HOSPITAL | Age: 54
End: 2019-02-12
Attending: PHYSICIAN ASSISTANT
Payer: COMMERCIAL

## 2019-02-12 DIAGNOSIS — M23.201 OLD TEAR OF LATERAL MENISCUS OF LEFT KNEE, UNSPECIFIED TEAR TYPE: ICD-10-CM

## 2019-02-12 DIAGNOSIS — R29.898 LEFT LEG WEAKNESS: ICD-10-CM

## 2019-02-12 DIAGNOSIS — S83.242D TEAR OF MEDIAL MENISCUS OF LEFT KNEE, UNSPECIFIED TEAR TYPE, UNSPECIFIED WHETHER OLD OR CURRENT TEAR, SUBSEQUENT ENCOUNTER: Primary | ICD-10-CM

## 2019-02-12 PROBLEM — M25.562 LEFT KNEE PAIN: Status: ACTIVE | Noted: 2019-02-12

## 2019-02-12 PROCEDURE — 97140 MANUAL THERAPY 1/> REGIONS: CPT | Mod: PN

## 2019-02-12 PROCEDURE — 97161 PT EVAL LOW COMPLEX 20 MIN: CPT | Mod: PN

## 2019-02-12 NOTE — PLAN OF CARE
TIME RECORD    Date: 02/12/2019    Start Time:  1015  Stop Time:  1100    PROCEDURES:    TIMED  Procedure Time Min.   Manual tx Start:1045  Stop:1100     Start:  Stop:     Start:  Stop:     Start:  Stop:          UNTIMED  Procedure Time Min.   eval Start:1015  Stop:1045     Start:  Stop:      Total Timed Minutes:  15  Total Timed Units:  1  Total Untimed Units:  1  Charges Billed/# of units:  2    OUTPATIENT PHYSICAL THERAPY   PATIENT EVALUATION  Onset Date: 11/01/18  Primary Diagnosis:   1. Tear of medial meniscus of left knee, unspecified tear type, unspecified whether old or current tear, subsequent encounter     2. Old tear of lateral meniscus of left knee, unspecified tear type     3. Left leg weakness       Treatment Diagnosis: debility due to left knee pain  Past Medical History:   Diagnosis Date    Anemia      Precautions: none  Prior Therapy: none  Medications: Tahira Jenkins has a current medication list which includes the following prescription(s): amlodipine, b complex vitamins, estradiol 0.05 mg/24 hr td ptsw, ferrous gluconate, furosemide, ibuprofen, meloxicam, multivitamin, oxybutynin, and tramadol, and the following Facility-Administered Medications: testosterone cypionate.  Nutrition:  Overweight  History of Present Illness: reports hx of sarah. knee pain since November 2018; imaging reveals baker's cyst in lt knee and both medial and lateral meniscal tears in that same knee  Prior Level of Function: Independent  Social History: works as a manager at [employer]  Place of Residence (Steps/Adaptations): lives w/ family in 1-story home (no steps)  Functional Deficits Leading to Referral/Nature of Injury: difficulty performing everyday activities  Patient Therapy Goals: decrease c/o pain    Subjective     Tahira Jenkins states that her sarah. knee pain limits her ability to perform her everyday activities.    Pain:  Location: sarah. knees  Description: Aching and Dull  Activities Which  Increase Pain: Bending  Activities Which Decrease Pain: ice and rest  Pain Scale: 3/10 at best 5/10 now  8/10 at worst    Objective     Posture: guarded due to pain  Palpation: pain w/ palpation to affected area; sarah. michael-patella edema noted (rt > lt)  Sensation: intact  DTRs:  Range of Motion/Strength: MMT = 4-/5 sarah. knees and hips, 4/5 sarah. ankles; AROM = WFL throughout    Flexibility: WFL  Gait: 2 x 75 ft w/out AD and SBA  Analysis: decreased sarah. stance time  Bed Mobility: NT  Transfers: Assistance - SBA  Special Tests: LEFS = 23/80  Other:   Treatment: kinesio taping lt knee = lymphatic drainage technique    Assessment       Initial Assessment (Pertinent finding, problem list and factors affecting outcome): presents w/ pain in sarah. knees that limits patient's ability to perform her everyday activities; pt. would benefit from PT to address these areas and to establish the HEP  Rehab Potiential: fair    Short Term Goals (3 Weeks):   1.  Radha. tx session w/out increase in symptoms  2.  Decrease c/o pain to 3/10    Long Term Goals (6 Weeks):   1.  Improve sarah. knee MMT 1/2 grade  2.  Improve LEFS score to 30/80  3.  Demo comp w/ HEP    Plan     Certification Period: 02/12/19 to 03/23/19  Recommended Treatment Plan: 2 times per week for 6 weeks: Electrical Stimulation for pain, Gait Training, Manual Therapy, Moist Heat/ Ice, Patient Education, Therapeutic Activites, Therapeutic Exercise, Ultrasound and HEP  Other Recommendations: NA      Therapist: Agus Thurston, PT    I CERTIFY THE NEED FOR THESE SERVICES FURNISHED UNDER THIS PLAN OF TREATMENT AND WHILE UNDER MY CARE    Physician's comments: ________________________________________________________________________________________________________________________________________________      Physician's Name: ___________________________________

## 2019-02-15 ENCOUNTER — CLINICAL SUPPORT (OUTPATIENT)
Dept: REHABILITATION | Facility: HOSPITAL | Age: 54
End: 2019-02-15
Attending: PHYSICIAN ASSISTANT
Payer: COMMERCIAL

## 2019-02-15 DIAGNOSIS — R29.898 LEFT LEG WEAKNESS: ICD-10-CM

## 2019-02-15 DIAGNOSIS — S83.242D TEAR OF MEDIAL MENISCUS OF LEFT KNEE, UNSPECIFIED TEAR TYPE, UNSPECIFIED WHETHER OLD OR CURRENT TEAR, SUBSEQUENT ENCOUNTER: Primary | ICD-10-CM

## 2019-02-15 DIAGNOSIS — M23.201 OLD TEAR OF LATERAL MENISCUS OF LEFT KNEE, UNSPECIFIED TEAR TYPE: ICD-10-CM

## 2019-02-15 PROCEDURE — 97110 THERAPEUTIC EXERCISES: CPT | Mod: PN

## 2019-02-15 NOTE — PROGRESS NOTES
Name: Tahira Jenkins  Clinic Number: [id number]  Date of Treatment: 02/15/2019   Diagnosis:   Encounter Diagnoses   Name Primary?    Tear of medial meniscus of left knee, unspecified tear type, unspecified whether old or current tear, subsequent encounter Yes    Old tear of lateral meniscus of left knee, unspecified tear type     Left leg weakness        Time in: 1605  Time Out: 1700  Total Treatment Time: 55  Group Time: NA      Subjective:    Tahira reports improvement of symptoms.  Patient reports their pain to be 4/10 on a 0-10 scale with 0 being no pain and 10 being the worst pain imaginable.    Objective    Tahira received therapeutic exercises to develop strength, endurance and balance for 55 minutes including:     X 10 min NuStep (L3)  X 20 supine lt LE there ex = HS, hip ABD/ADD, SLR  X 20 ea up/down 2-in step = forwards/sideways  X 20 ea AirEx mini sq and HR/TR  X 20 seated lt LE there ex = marching, LAQ, ball sq, hip ABD (RTB)  X 20 ea shuttle mini sq and HR (50#)  X 20 SportsArt knee curl (22#)    Assessment:      was able to carlos. tx session w/out increase in symptoms.    Pt will continue to benefit from skilled PT intervention. Medical Necessity is demonstrated by:  Pain limits function of effected part for some activities, Unable to participate fully in daily activities and Weakness.    Patient is making fair progress towards established goals.    New/Revised goals: NA      Plan:  Continue with established Plan of Care towards PT goals.

## 2019-02-18 DIAGNOSIS — M25.562 PAIN IN BOTH KNEES, UNSPECIFIED CHRONICITY: Primary | ICD-10-CM

## 2019-02-18 DIAGNOSIS — M25.561 PAIN IN BOTH KNEES, UNSPECIFIED CHRONICITY: Primary | ICD-10-CM

## 2019-02-19 ENCOUNTER — CLINICAL SUPPORT (OUTPATIENT)
Dept: REHABILITATION | Facility: HOSPITAL | Age: 54
End: 2019-02-19
Attending: PHYSICIAN ASSISTANT
Payer: COMMERCIAL

## 2019-02-19 DIAGNOSIS — R29.898 LEFT LEG WEAKNESS: ICD-10-CM

## 2019-02-19 DIAGNOSIS — M25.562 LEFT KNEE PAIN, UNSPECIFIED CHRONICITY: ICD-10-CM

## 2019-02-19 PROCEDURE — 97110 THERAPEUTIC EXERCISES: CPT | Mod: PN

## 2019-02-19 NOTE — PROGRESS NOTES
"[id number]  Date of Treatment: 02/19/2019   Diagnosis:   Encounter Diagnoses   Name Primary?    Left knee pain, unspecified chronicity     Left leg weakness        Time in: 1515  Time Out: 1600  Total Treatment Time: 45    Subjective:    Tahira reports R knee stiffness. L knee is feeling good. Felt good after last session and always feels better after mobility. Sits in tall chairs at work that do not allow feet on the floor and her knees get stiff.  Late arrival.     Objective:    Patient received individual therapy to increase strength, endurance, ROM, flexibility, posture and core stabilization with activities as follows:     Tahira received therapeutic exercises to develop strength, endurance, ROM, flexibility, posture and core stabilization for 45 minutes including:     NuStep L3 10' LE's only    Standing ex's 10/2:     Airex HR/TR B     Airex minisquats B     Step up fwd 2" L     Step up lat 2"  Supine TE 10/2:     HS L     SLR L     SAQ B     QS B     Hip aBd/add    Continue HEP daily.    Pt demo good understanding of the education provided. Patient demonstrated good return demonstration of activities.     Assessment:     Frequent cues for locked L knee in extn with SLR and hip aBd supine.     Pt will continue to benefit from skilled PT intervention. Medical Necessity is demonstrated by:  Requires skilled supervision to complete and progress HEP and Weakness.    Patient is making good progress towards established goals.    Plan:    Continue with established Plan of Care towards PT goals.     "

## 2019-02-21 ENCOUNTER — CLINICAL SUPPORT (OUTPATIENT)
Dept: OBSTETRICS AND GYNECOLOGY | Facility: CLINIC | Age: 54
End: 2019-02-21
Payer: COMMERCIAL

## 2019-02-21 DIAGNOSIS — Z79.890 HORMONE REPLACEMENT THERAPY (HRT): Primary | ICD-10-CM

## 2019-02-21 PROCEDURE — 99999 PR PBB SHADOW E&M-EST. PATIENT-LVL II: CPT | Mod: PBBFAC,,,

## 2019-02-21 PROCEDURE — 96372 THER/PROPH/DIAG INJ SC/IM: CPT | Mod: S$GLB,,, | Performed by: OBSTETRICS & GYNECOLOGY

## 2019-02-21 PROCEDURE — 96372 PR INJECTION,THERAP/PROPH/DIAG2ST, IM OR SUBCUT: ICD-10-PCS | Mod: S$GLB,,, | Performed by: OBSTETRICS & GYNECOLOGY

## 2019-02-21 PROCEDURE — 99999 PR PBB SHADOW E&M-EST. PATIENT-LVL II: ICD-10-PCS | Mod: PBBFAC,,,

## 2019-02-21 RX ORDER — TESTOSTERONE CYPIONATE 200 MG/ML
50 INJECTION, SOLUTION INTRAMUSCULAR
Status: COMPLETED | OUTPATIENT
Start: 2019-02-21 | End: 2019-06-14

## 2019-02-21 RX ADMIN — TESTOSTERONE CYPIONATE 50 MG: 200 INJECTION, SOLUTION INTRAMUSCULAR at 03:02

## 2019-02-25 ENCOUNTER — CLINICAL SUPPORT (OUTPATIENT)
Dept: REHABILITATION | Facility: HOSPITAL | Age: 54
End: 2019-02-25
Attending: PHYSICIAN ASSISTANT
Payer: COMMERCIAL

## 2019-02-25 DIAGNOSIS — S83.242D TEAR OF MEDIAL MENISCUS OF LEFT KNEE, UNSPECIFIED TEAR TYPE, UNSPECIFIED WHETHER OLD OR CURRENT TEAR, SUBSEQUENT ENCOUNTER: Primary | ICD-10-CM

## 2019-02-25 DIAGNOSIS — M23.201 OLD TEAR OF LATERAL MENISCUS OF LEFT KNEE, UNSPECIFIED TEAR TYPE: ICD-10-CM

## 2019-02-25 DIAGNOSIS — R29.898 LEFT LEG WEAKNESS: ICD-10-CM

## 2019-02-25 PROCEDURE — 97110 THERAPEUTIC EXERCISES: CPT | Mod: PN

## 2019-02-25 NOTE — PROGRESS NOTES
Name: Tahira Jenkins  Clinic Number: [id number]  Date of Treatment: 02/25/2019   Diagnosis:   Encounter Diagnoses   Name Primary?    Tear of medial meniscus of left knee, unspecified tear type, unspecified whether old or current tear, subsequent encounter Yes    Old tear of lateral meniscus of left knee, unspecified tear type     Left leg weakness        Time in: 1515  Time Out: 1600  Total Treatment Time: 45  Group Time: NA      Subjective:    Tahira reports improvement of symptoms in lt knee.  Patient reports their pain to be 2/10 on a 0-10 scale with 0 being no pain and 10 being the worst pain imaginable.    Objective    Tahira received therapeutic exercises to develop strength, endurance and balance for 45 minutes including:     X 10 min NuStep (L3)  X 20 ea up/down 4-in step = forwards/sideways  X 20 ea AirEx mini sq and HR/TR  X 20 standing lt LE 4-way SLR (2#)  X 20 seated lt LE there ex = marching (2#), LAQ (2#), ball sq, hip ABD (GTB)  2 X 20 ea shuttle mini sq and HR (56#)    Assessment:      was able to carlos. tx session w/out increase in symptoms.    Pt will continue to benefit from skilled PT intervention. Medical Necessity is demonstrated by:  Pain limits function of effected part for some activities, Unable to participate fully in daily activities and Weakness.    Patient is making good progress towards established goals.    New/Revised goals: NA      Plan:  Continue with established Plan of Care towards PT goals.

## 2019-02-28 ENCOUNTER — CLINICAL SUPPORT (OUTPATIENT)
Dept: REHABILITATION | Facility: HOSPITAL | Age: 54
End: 2019-02-28
Attending: PHYSICIAN ASSISTANT
Payer: COMMERCIAL

## 2019-02-28 DIAGNOSIS — R29.898 LEFT LEG WEAKNESS: ICD-10-CM

## 2019-02-28 PROCEDURE — 97110 THERAPEUTIC EXERCISES: CPT | Mod: PN

## 2019-02-28 NOTE — PROGRESS NOTES
"Name: Tahira Starkey [facility] Number: [id number]  Date of Treatment: 02/28/2019   Diagnosis:   Encounter Diagnosis   Name Primary?    Left leg weakness        Time in: 1010  Time Out: 1100  Total Treatment Time: 25    Subjective:    Tahira reports R knee discomfort.  Patient reports their pain to be 2/10 on a 0-10 scale with 0 being no pain and 10 being the worst pain imaginable.    Objective:    Patient received individual therapy to increase strength, endurance, ROM, flexibility, posture and core stabilization with activities as follows:     Tahira received therapeutic exercises to develop strength, endurance, ROM, flexibility, posture and core stabilization for 25 minutes including:     NuStep L3 10' LE's only     Standing ex's 10/2:     Airex HR/TR B     Airex minisquats B     Step up fwd 4" L     Supine TE 10/2:     HS L     SLR L     SAQ B     QS B     Hip aBd/add    Continue HEP daily.    Pt demo good understanding of the education provided. Patient demonstrated good return demonstration of activities.     Assessment:     Good tolerance for ex's without increase in discomfort.     Pt will continue to benefit from skilled PT intervention. Medical Necessity is demonstrated by:  Requires skilled supervision to complete and progress HEP and Weakness.    Patient is making good progress towards established goals.    Plan:    Continue with established Plan of Care towards PT goals.     "

## 2019-03-04 ENCOUNTER — CLINICAL SUPPORT (OUTPATIENT)
Dept: REHABILITATION | Facility: HOSPITAL | Age: 54
End: 2019-03-04
Attending: PHYSICIAN ASSISTANT
Payer: COMMERCIAL

## 2019-03-04 DIAGNOSIS — R29.898 LEFT LEG WEAKNESS: ICD-10-CM

## 2019-03-04 DIAGNOSIS — M25.562 LEFT KNEE PAIN, UNSPECIFIED CHRONICITY: ICD-10-CM

## 2019-03-04 PROCEDURE — 97110 THERAPEUTIC EXERCISES: CPT | Mod: PN

## 2019-03-04 NOTE — PROGRESS NOTES
"Name: Tahira Starkey [facility] Number: [id number]  Date of Treatment: 03/04/2019   Diagnosis:   Encounter Diagnoses   Name Primary?    Left knee pain, unspecified chronicity     Left leg weakness        Time in: 1115  Time Out: 1203  Total Treatment Time: 48  Group Time: 0      Subjective:    Tahira reports R knee soreness with swelling, L knee "better".  Patient reports their pain to be 3/10 R knee and 1/10 L knee on a 0-10 scale with 0 being no pain and 10 being the worst pain imaginable.    Objective    Tahira received therapeutic exercises to develop strength, endurance and flexibility for 48 minutes including:     NuStep Lv4 10'  Hamstring stretch 10" x10 supine R/L  SLR 3x10 2# L 0# R  Sit LAQ 2x10 R/L    Kinesiotape lymphedema taping to R LE over knee.      Written Home Exercises Provided: yes    Pt demo good understanding of the education provided. Tahira demonstrated good return demonstration of activities.     Assessment:     Pt will continue to benefit from skilled PT intervention. Medical Necessity is demonstrated by:  Pain limits function of effected part for some activities, Unable to participate fully in daily activities, Requires skilled supervision to complete and progress HEP and Weakness.    Patient is making good progress towards established goals.    Plan:    Continue with established Plan of Care towards PT goals.   "

## 2019-03-06 ENCOUNTER — CLINICAL SUPPORT (OUTPATIENT)
Dept: REHABILITATION | Facility: HOSPITAL | Age: 54
End: 2019-03-06
Attending: PHYSICIAN ASSISTANT
Payer: COMMERCIAL

## 2019-03-06 DIAGNOSIS — M25.562 LEFT KNEE PAIN, UNSPECIFIED CHRONICITY: ICD-10-CM

## 2019-03-06 DIAGNOSIS — R29.898 LEFT LEG WEAKNESS: ICD-10-CM

## 2019-03-06 PROCEDURE — 97110 THERAPEUTIC EXERCISES: CPT | Mod: PN

## 2019-03-06 NOTE — PROGRESS NOTES
"Name: Tahira Jenkins  [facility] Number: [id number]  Date of Treatment: 03/06/2019   Diagnosis:   Encounter Diagnoses   Name Primary?    Left knee pain, unspecified chronicity     Left leg weakness        Time in: 1226  Time Out: 1306  Total Treatment Time: 40        Subjective:    Tahira reports minimal pain today in her L knee but her R knee is hurting her.  Pt  states she still has discomfort when she is sleeping.  Patient reports their pain to be 2/10 on a 0-10 scale with 0 being no pain and 10 being the worst pain imaginable.    Objective    Patient received individual therapy to increase strength, endurance, ROM and flexibility with 0 patients with activities as follows:     Tahira received therapeutic exercises to develop strength, endurance, ROM and flexibility for 40 minutes including:     nustep x 10 min L-4  Standing Airex HR/TR B x 30 reps  Standing Airex minisquats B LE x 30 reps  Standing Step up fwd 4" L   QS x 30 reps  Supine HS   Supine  SLR 2# B LE 2 x 10 reps  supine  SAQ B    Pt demo good understanding of the education provided. Tahira demonstrated good return demonstration of activities.     Assessment:   Slight extension lag with SLR L>R.   Instructed pt to apply ice to decrease pain, inflammation, and stiffness.    Pt will continue to benefit from skilled PT intervention. Medical Necessity is demonstrated by:  Pain limits function of effected part for some activities, Unable to participate fully in daily activities, Requires skilled supervision to complete and progress HEP, Weakness and Edema.    Patient is making good progress towards established goals.    Plan:  Continue with established Plan of Care towards PT goals.   "

## 2019-03-12 ENCOUNTER — CLINICAL SUPPORT (OUTPATIENT)
Dept: REHABILITATION | Facility: HOSPITAL | Age: 54
End: 2019-03-12
Attending: PHYSICIAN ASSISTANT
Payer: COMMERCIAL

## 2019-03-12 DIAGNOSIS — S83.242D TEAR OF MEDIAL MENISCUS OF LEFT KNEE, UNSPECIFIED TEAR TYPE, UNSPECIFIED WHETHER OLD OR CURRENT TEAR, SUBSEQUENT ENCOUNTER: Primary | ICD-10-CM

## 2019-03-12 DIAGNOSIS — M23.201 OLD TEAR OF LATERAL MENISCUS OF LEFT KNEE, UNSPECIFIED TEAR TYPE: ICD-10-CM

## 2019-03-12 DIAGNOSIS — R29.898 LEFT LEG WEAKNESS: ICD-10-CM

## 2019-03-12 PROCEDURE — 97110 THERAPEUTIC EXERCISES: CPT | Mod: PN

## 2019-03-12 NOTE — PROGRESS NOTES
Name: Tahira Jenkins  Clinic Number: [id number]  Date of Treatment: 03/12/2019   Diagnosis:   Encounter Diagnoses   Name Primary?    Tear of medial meniscus of left knee, unspecified tear type, unspecified whether old or current tear, subsequent encounter Yes    Old tear of lateral meniscus of left knee, unspecified tear type     Left leg weakness        Time in: 1605  Time Out: 1700  Total Treatment Time: 55  Group Time: NA      Subjective:    Tahira reports improvement of symptoms.  Patient reports their pain to be 1/10 on a 0-10 scale with 0 being no pain and 10 being the worst pain imaginable.    Objective    Tahira received therapeutic exercises to develop strength, endurance and balance for 55 minutes including:     X 10 min NuStep (L4)  X 20 ea up/down 4-in step = forwards/sideways  X 20 ea AirEx mini sq and HR/TR  5 x 30 sec SLS lt LE on blue pad  X 20 supine sarah. LE there ex (2#) = HS, hip ABD/ADD, SLR  X 20 seated lt LE there ex = marching (2#), LAQ (2#), ball sq, hip ABD (GTB)  2 x 20 ea shuttle mini sq and HR (62#)  2 x 20 SportsArt knee curl (22#)    Assessment:      was able to carlos. tx session w/out increase in symptoms.    Pt will continue to benefit from skilled PT intervention. Medical Necessity is demonstrated by:  Pain limits function of effected part for some activities, Unable to participate fully in daily activities and Weakness.    Patient is making good progress towards established goals.    New/Revised goals: NA      Plan:  Continue with established Plan of Care towards PT goals.

## 2019-03-15 ENCOUNTER — CLINICAL SUPPORT (OUTPATIENT)
Dept: REHABILITATION | Facility: HOSPITAL | Age: 54
End: 2019-03-15
Attending: PHYSICIAN ASSISTANT
Payer: COMMERCIAL

## 2019-03-15 DIAGNOSIS — R29.898 LEFT LEG WEAKNESS: ICD-10-CM

## 2019-03-15 PROCEDURE — 97110 THERAPEUTIC EXERCISES: CPT | Mod: PN

## 2019-03-15 NOTE — PROGRESS NOTES
Name: Tahira Jenkins  [facility] Number: [id number]  Date of Treatment: 03/15/2019   Diagnosis:   Encounter Diagnosis   Name Primary?    Left leg weakness        Time in: 0910  Time Out: 1000  Total Treatment Time: 50    Subjective:    Tahira reports improvement of symptoms and no pain L knee but does have 4/10 pain R knee. Late arrival.     Objective:    Patient received individual therapy to increase strength, endurance, ROM, flexibility, posture and core stabilization with activities as follows:     Tahira received therapeutic exercises to develop strength, endurance, ROM, flexibility, posture and core stabilization for 50 minutes including:     NuStep L4 16' LE's only  Seated hamstring stretches 3/30s L/R  Standing gastroc stretches 3/30s B over 1/2 foam roll in // bars  Airex HR/TR B 10/3  L SLS 3/30s  Supine SLR 10/3 L    Continue HEP daily.    Pt demo good understanding of the education provided. Patient demonstrated good return demonstration of activities.     Assessment:     Modified routine today for decreased R knee involvement 2* increased antalgic gait 2* R knee discomfort. Pt to see MD Tuesday. Slow moving with decreased step lengths and clearance B.     Pt will continue to benefit from skilled PT intervention. Medical Necessity is demonstrated by:  Requires skilled supervision to complete and progress HEP and Weakness.    Patient is making good progress towards established goals.    Plan:    Continue with established Plan of Care towards PT goals.

## 2019-03-19 ENCOUNTER — CLINICAL SUPPORT (OUTPATIENT)
Dept: REHABILITATION | Facility: HOSPITAL | Age: 54
End: 2019-03-19
Attending: PHYSICIAN ASSISTANT
Payer: COMMERCIAL

## 2019-03-19 ENCOUNTER — OFFICE VISIT (OUTPATIENT)
Dept: SPORTS MEDICINE | Facility: CLINIC | Age: 54
End: 2019-03-19
Payer: COMMERCIAL

## 2019-03-19 VITALS
BODY MASS INDEX: 36.73 KG/M2 | HEART RATE: 91 BPM | HEIGHT: 67 IN | SYSTOLIC BLOOD PRESSURE: 159 MMHG | WEIGHT: 234 LBS | DIASTOLIC BLOOD PRESSURE: 98 MMHG

## 2019-03-19 DIAGNOSIS — M25.461 EFFUSION OF RIGHT KNEE: ICD-10-CM

## 2019-03-19 DIAGNOSIS — M25.561 CHRONIC PAIN OF RIGHT KNEE: Primary | ICD-10-CM

## 2019-03-19 DIAGNOSIS — G89.29 CHRONIC PAIN OF RIGHT KNEE: Primary | ICD-10-CM

## 2019-03-19 DIAGNOSIS — R29.898 LEFT LEG WEAKNESS: ICD-10-CM

## 2019-03-19 PROCEDURE — 99999 PR PBB SHADOW E&M-EST. PATIENT-LVL III: CPT | Mod: PBBFAC,,, | Performed by: PHYSICIAN ASSISTANT

## 2019-03-19 PROCEDURE — 99214 OFFICE O/P EST MOD 30 MIN: CPT | Mod: 25,S$GLB,, | Performed by: PHYSICIAN ASSISTANT

## 2019-03-19 PROCEDURE — 20610 PR DRAIN/INJECT LARGE JOINT/BURSA: ICD-10-PCS | Mod: RT,S$GLB,, | Performed by: PHYSICIAN ASSISTANT

## 2019-03-19 PROCEDURE — 20610 DRAIN/INJ JOINT/BURSA W/O US: CPT | Mod: RT,S$GLB,, | Performed by: PHYSICIAN ASSISTANT

## 2019-03-19 PROCEDURE — 99214 PR OFFICE/OUTPT VISIT, EST, LEVL IV, 30-39 MIN: ICD-10-PCS | Mod: 25,S$GLB,, | Performed by: PHYSICIAN ASSISTANT

## 2019-03-19 PROCEDURE — 97110 THERAPEUTIC EXERCISES: CPT | Mod: PN

## 2019-03-19 PROCEDURE — 99999 PR PBB SHADOW E&M-EST. PATIENT-LVL III: ICD-10-PCS | Mod: PBBFAC,,, | Performed by: PHYSICIAN ASSISTANT

## 2019-03-19 RX ORDER — LIDOCAINE HYDROCHLORIDE 10 MG/ML
5 INJECTION INFILTRATION; PERINEURAL
Status: COMPLETED | OUTPATIENT
Start: 2019-03-19 | End: 2019-03-19

## 2019-03-19 RX ORDER — BUPIVACAINE HYDROCHLORIDE 2.5 MG/ML
3 INJECTION, SOLUTION INFILTRATION; PERINEURAL
Status: COMPLETED | OUTPATIENT
Start: 2019-03-19 | End: 2019-03-19

## 2019-03-19 RX ORDER — TRIAMCINOLONE ACETONIDE 40 MG/ML
40 INJECTION, SUSPENSION INTRA-ARTICULAR; INTRAMUSCULAR
Status: COMPLETED | OUTPATIENT
Start: 2019-03-19 | End: 2019-03-19

## 2019-03-19 RX ORDER — DICLOFENAC SODIUM 10 MG/G
2 GEL TOPICAL 2 TIMES DAILY
Qty: 1 TUBE | Refills: 0 | Status: SHIPPED | OUTPATIENT
Start: 2019-03-19 | End: 2019-04-09 | Stop reason: SDUPTHER

## 2019-03-19 RX ADMIN — LIDOCAINE HYDROCHLORIDE 5 ML: 10 INJECTION INFILTRATION; PERINEURAL at 09:03

## 2019-03-19 RX ADMIN — TRIAMCINOLONE ACETONIDE 40 MG: 40 INJECTION, SUSPENSION INTRA-ARTICULAR; INTRAMUSCULAR at 09:03

## 2019-03-19 RX ADMIN — BUPIVACAINE HYDROCHLORIDE 7.5 MG: 2.5 INJECTION, SOLUTION INFILTRATION; PERINEURAL at 09:03

## 2019-03-19 NOTE — PROGRESS NOTES
CC: Bilateral (right>left) knee pain    53 y.o. Female with a history of bilateral knee pain x November 2018. She was last seen on 2/6/19 when her left knee pain was more significant than the right. She was given a CSI at that visit with 99% relief. She returns today complaining of right>left knee pain. No injury or trauma. Pain came on gradually. She reports pain comes and goes, she has good days and bad days with her knees. She has been in PT at [facility] which does help some. 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 Pain is most significant in the morning getting out of bed. No prior knee surgery or injury. She has not been taking any medication for the knees.     She reports that the pain and weakness. It also bothers her at night.     + mechanical symptoms, - instability    Is affecting ADLs.  Pain is 4/10 at it's worst. 2/10 right now.    REVIEW OF SYSTEMS:  Constitution: Negative. Negative for chills, fever and night sweats.   HENT: Negative for congestion and headaches.    Eyes: Negative for blurred vision, left vision loss and right vision loss.   Cardiovascular: Negative for chest pain and syncope.   Respiratory: Negative for cough and shortness of breath.    Endocrine: Negative for polydipsia, polyphagia and polyuria.   Hematologic/Lymphatic: Negative for bleeding problem. Does not bruise/bleed easily.   Skin: Negative for dry skin, itching and rash.   Musculoskeletal: Negative for falls. Positive for right knee pain and muscle weakness.   Gastrointestinal: Negative for abdominal pain and bowel incontinence.   Genitourinary: Negative for bladder incontinence and nocturia.   Neurological: Negative for disturbances in coordination, loss of balance and seizures.   Psychiatric/Behavioral: Negative for depression. The patient does not have insomnia.    Allergic/Immunologic: Negative for hives and persistent infections.     PAST MEDICAL HISTORY:    Past Medical History:   Diagnosis Date    Anemia         PAST SURGICAL HISTORY:   Past Surgical History:   Procedure Laterality Date    HYSTERECTOMY      HYSTERECTOMY-TOTAL-ABDOMINAL AND BSO Bilateral 12/2/2015    Performed by [facility]    VAGINAL DELIVERY      x3       FAMILY HISTORY:   Family History   Problem Relation Age of Onset    Diabetes Father     No Known Problems Mother     Hypertension Brother     Breast cancer Neg Hx     Colon cancer Neg Hx     Ovarian cancer Neg Hx        SOCIAL HISTORY:   Social History     Socioeconomic History    Marital status: Single     Spouse name: Not on file    Number of children: Not on file    Years of education: Not on file    Highest education level: Not on file   Social Needs    Financial resource strain: Not on file    Food insecurity - worry: Not on file    Food insecurity - inability: Not on file    Transportation needs - medical: Not on file    Transportation needs - non-medical: Not on file   Occupational History    Not on file   Tobacco Use    Smoking status: Never Smoker    Smokeless tobacco: Never Used   Substance and Sexual Activity    Alcohol use: No     Alcohol/week: 0.0 oz    Drug use: No    Sexual activity: Not Currently   Other Topics Concern    Not on file   Social History Narrative    Not on file       MEDICATIONS:     Current Outpatient Medications:     amLODIPine (NORVASC) 10 MG tablet, TAKE ONE TABLET BY MOUTH ONCE DAILY, Disp: 90 tablet, Rfl: 3    b complex vitamins capsule, Take 1 capsule by mouth once daily., Disp: 30 capsule, Rfl: 3    estradiol 0.05 mg/24 hr td ptsw (VIVELLE-DOT) 0.05 mg/24 hr, Place 1 patch onto the skin twice a week., Disp: 24 patch, Rfl: 3    ferrous gluconate (FERGON) 324 MG tablet, Take 324 mg by mouth daily with breakfast., Disp: , Rfl:     furosemide (LASIX) 20 MG tablet, Take 1 tablet (20 mg total) by mouth once daily., Disp: 30 tablet, Rfl: 11    ibuprofen (ADVIL,MOTRIN) 600 MG tablet, Take 1 tablet (600 mg total) by  "mouth every 6 (six) hours as needed for Pain., Disp: 20 tablet, Rfl: 0    meloxicam (MOBIC) 15 MG tablet, TAKE ONE TABLET BY MOUTH ONCE DAILY, Disp: 30 tablet, Rfl: 3    multivitamin capsule, Take 1 capsule by mouth once daily., Disp: , Rfl:     oxybutynin (DITROPAN) 5 MG Tab, Take 1 tablet (5 mg total) by mouth 2 (two) times daily., Disp: 60 tablet, Rfl: 11    traMADol (ULTRAM) 50 mg tablet, Take 1 tablet (50 mg total) by mouth every 6 (six) hours as needed for Pain., Disp: 20 tablet, Rfl: 0    Current Facility-Administered Medications:     testosterone cypionate injection 50 mg, 50 mg, Intramuscular, Q28 Days, Lizz Aden MD, 50 mg at 02/21/19 7844    ALLERGIES:   Review of patient's allergies indicates:   Allergen Reactions    Lisinopril Other (See Comments)     Cough      Lisinopril-hydrochlorothiazide      Cough      Meloxicam Nausea Only    Vivelle [estradiol] Itching       VITAL SIGNS:   Ht 5' 7" (1.702 m)   Wt 106.1 kg (234 lb)   LMP 11/23/2015   BMI 36.65 kg/m²      PHYSICAL EXAMINATION  General:  The patient is alert and oriented x 3.  Mood is pleasant.  Observation of ears, eyes and nose reveal no gross abnormalities.  No labored breathing observed.    LEFT KNEE EXAMINATION     OBSERVATION / INSPECTION   Gait:   Nonantalgic   Alignment:  Neutral   Scars:   None   Muscle atrophy: Mild  Effusion:  Mild   Warmth:  None   Discoloration:   none     TENDERNESS / CREPITUS (T / C):          T / C      T / C   Patella   - / -   Lateral joint line   + / - (right only)   Peripatellar medial  -  Medial joint line    + / - (right only)   Peripatellar lateral -  Medial plica   - / -   Patellar tendon -   Popliteal fossa   - / -   Quad tendon   -   Gastrocnemius   -   Prepatellar Bursa - / -   Quadricep   -   Tibial tubercle  -  Thigh/hamstring  -   Pes anserine/HS -  Fibula    -   ITB   - / -  Tibia     -   Tib/fib joint  - / -  LCL    -     MFC   - / -   MCL: Proximal  -    LFC   - / - "    Distal   -          ROM: (* = pain)  PASSIVE   ACTIVE    Left :   0 / 0 / 125   0 / 0 / 120    Right :    0 / 0 / 105   0 / 0 / 100    Patellofemoral examination:  See above noted areas of tenderness.   Patella position    Subluxation / dislocation: Centered           Sup. / Inf;   Normal   Crepitus (PF):    Absent   Patellar Mobility:       Medial-lateral:   Normal    Superior-inferior:  Normal    Inferior tilt   Normal    Patellar tendon:  Normal   Lateral tilt:    +    MENISCAL SIGNS:     Pain on terminal extension:  -  Pain on terminal flexion:  +  Shannons maneuver:  + (for diffuse pain  Squat     + (for diffuse pain b/l)    LIGAMENT EXAMINATION:  ACL / Lachman:  normal (-1 to 2mm)    PCL-Post.  drawer: normal 0 to 2mm  MCL- Valgus:  normal 0 to 2mm  LCL- Varus:  normal 0 to 2mm    STRENGTH: (* = with pain) BILATERAL   Quadricep   4*/5   Hamstrin*/5    EXTREMITY NEURO-VASCULAR EXAMINATION:   Sensation:  Grossly intact to light touch all dermatomal regions.   Motor Function:  Fully intact motor function at hip, knee, foot and ankle    DTRs;  quadriceps and  achilles 2+.  No clonus and downgoing Babinski.    Vascular status:  DP and PT pulses 2+, brisk capillary refill, symmetric.     IMAGING:     X-rays 19 including standing, weight bearing AP and flexion bilateral knees, lateral and merchant views ordered and images reviewed by me show:    No fracture, dislocation or other pathology   Medial compartment: no degenerative changes   Lateral compartment: no degenerative changes   Patellofemoral compartment: mild degenerative changes     MRI LEFT KNEE 19    FINDINGS:  Menisci:  Mucoid signal with vertical linear component at the peripheral third of the posterior horn medial meniscus-body segment junction.  Vertical signal extends the tibial articular surface on single slice (coronal PD fat-sat series 8, image 14), not definitive for tear.  There is globular intrameniscal signal of the  posterior horn lateral meniscus with inferior extension to the tibial articular surface compatible with fraying.    Ligaments:  ACL, PCL, and LCL complex structures in tact.  Intrasubstance T2 signal with periligamentous edema of the proximal third of the MCL.    Tendons:  Extensor mechanism is maintained.    Cartilage:    Patellofemoral: High-grade partial to full thickness loss along the lateral patellar facet with scattered subchondral edema.  Full-thickness fissuring of the adjacent lateral trochlea identified without associated subchondral edema.    Medial tibiofemoral: Articular cartilage is maintained.    Lateral tibiofemoral: Articular cartilage is maintained.    Bone: No fracture or marrow replacing process.    Miscellaneous: Mild knee joint effusion.  Focal edema at the superolateral aspect of Hoffa's fat.  Tiny popliteal cyst.    Impression:  Mucoid degenerative signal with possible vertical tear involving the peripheral third of the medial meniscus posterior horn-body segment junction.    Mucoid degeneration and undersurface fraying of the posterior horn lateral meniscus.    Intrasubstance and perilgiamentous edema at the proximal third of the MCL, possible sequela of resolving/remote injury in the absence of acute trauma.    Advanced lateral patellar chondromalacia.    Mild knee joint effusion with associated synovitis.  Small popliteal cyst.    ASSESSMENT:    Right Knee Pain    PLAN:   1. Start voltaren gel  2. Continue PT at [facility]- new referral placed for right knee  3. CSI right knee  4. RTC as needed for follow up    Injection Procedure  A time out was performed, including verification of patient ID, procedure, site and side, availability of information and equipment, review of safety issues, and agreement with consent, the procedure site was marked.    After time out was performed, the patient was prepped aseptically with povidone-iodine swabsticks. A diagnostic and therapeutic injection  of 3:1cc Marcaine:Kenalog was given under sterile technique using a 22.5 gauge needle from the Superolateral aspect of the right Knee Joint in the supine position.      Tahira Jenkins had no adverse reactions to the medication. Pain decreased. She was instructed to apply ice to the joint for 20 minutes and avoid strenuous activities for 24-36 hours following the injection. She was warned of possible blood sugar and/or blood pressure changes during that time. Following that time, she can resume regular activities.    She was reminded to call the clinic immediately for any adverse side effects as explained in clinic today.      All questions were answered, pt will contact us for questions or concerns in the interim.

## 2019-03-19 NOTE — PROGRESS NOTES
Name: Tahira Jenkins   Clinic Number: [id number]   Age: 53 y.o.   Diagnosis:   Encounter Diagnosis   Name Primary?    Left leg weakness       Physician: Prem Shaw MD   Original Orders : PT eval and treat  Initial visit: 02/12/19  Date of Last visit: 03/19/19  Date of Discharge Note:  03/19/19  Total Visits Received: 10  Missed Visits: 1    Subjective: reports no pain in lt knee; states is awaiting approval on referral to address rt knee pain    Objective:  Treatment :    Included:Therapeutic exercise, AROM, Proprioception exercises , Stretching, Balance and Coordination ex and HEP        Treatment today:      Provided pt. w/ HEP - instructed to perform BID    Time In: 1250  Time Out: 1305     Assessment:  MMT = 4-/5 rt knee, 4+/5 lt knee; independent w/ HEP; LEFS = 40/80    Goals Achieved: all goals met    Discharge reason : Met goals    Discharge plan :Continue HEP and Pt to follow-up with MD as planned; restart PT once orders received to address rt knee pain    Plan:  This patient is discharged from Physical Therapy Services.

## 2019-03-19 NOTE — PROGRESS NOTES
Name: Tahira Jenkins  [facility] Number: [id number]  Date of Treatment: 03/19/2019   Diagnosis:   Encounter Diagnosis   Name Primary?    Left leg weakness        Time in: 1210  Time Out: 1300  Total Treatment Time: 50      Subjective:    Tahira reports to therapy after MD appt, where they attempted to aspirate R knee and she was unable to tolerate, they then gave her another steroid shot in R knee, with precautions of no vigorous activity for 24-36 hrs.  Pain remains worse in R LE than L LE.  Patient reports their pain to be 0/10 on a 0-10 scale with 0 being no pain and 10 being the worst pain imaginable.    Objective  Tahira received therapeutic exercises to develop strength, endurance, ROM and flexibility for 50 minutes including:   NuStep 3' L4  Quad set 3/10  Heel slides 3/10 L LE  Glute set 3/10  Straight leg raise 3/10 L  Supine hip abd 3/10 L  Bridges (unable to tolerate, back pain)  Ball squeeze 3/10  Supine CLAM with GTB 3/10    Written Home Exercises Provided: Cont with HEP  Pt demo good understanding of the education provided. Tahira demonstrated good return demonstration of activities.     Assessment:   Patient limited in activity 2* receiving steroid shot in R knee at Dr office, previous to todays visit, with note in chart that says no vigorous activity for 24-36 hrs. Remains with hyperextension in B LE's and weakness in hips/quads.  Progressive pain and weakness in R LE.    Pt will continue to benefit from skilled PT intervention. Medical Necessity is demonstrated by:  Fall Risk, Continued inability to participate in vocational pursuits, Pain limits function of effected part for some activities, Unable to participate fully in daily activities, Requires skilled supervision to complete and progress HEP and Weakness.    Patient is making good progress towards established goals.    Plan:  Care transferred to Agus Thurston PT for POC update

## 2019-03-21 ENCOUNTER — CLINICAL SUPPORT (OUTPATIENT)
Dept: OBSTETRICS AND GYNECOLOGY | Facility: CLINIC | Age: 54
End: 2019-03-21
Payer: COMMERCIAL

## 2019-03-21 PROCEDURE — 99999 PR PBB SHADOW E&M-EST. PATIENT-LVL II: CPT | Mod: PBBFAC,,,

## 2019-03-21 PROCEDURE — 96372 PR INJECTION,THERAP/PROPH/DIAG2ST, IM OR SUBCUT: ICD-10-PCS | Mod: S$GLB,,, | Performed by: OBSTETRICS & GYNECOLOGY

## 2019-03-21 PROCEDURE — 99999 PR PBB SHADOW E&M-EST. PATIENT-LVL II: ICD-10-PCS | Mod: PBBFAC,,,

## 2019-03-21 PROCEDURE — 96372 THER/PROPH/DIAG INJ SC/IM: CPT | Mod: S$GLB,,, | Performed by: OBSTETRICS & GYNECOLOGY

## 2019-03-21 RX ADMIN — TESTOSTERONE CYPIONATE 50 MG: 200 INJECTION, SOLUTION INTRAMUSCULAR at 03:03

## 2019-04-02 ENCOUNTER — CLINICAL SUPPORT (OUTPATIENT)
Dept: REHABILITATION | Facility: HOSPITAL | Age: 54
End: 2019-04-02
Attending: PHYSICIAN ASSISTANT
Payer: COMMERCIAL

## 2019-04-02 DIAGNOSIS — R29.898 RIGHT LEG WEAKNESS: ICD-10-CM

## 2019-04-02 DIAGNOSIS — M25.561 CHRONIC PAIN OF RIGHT KNEE: Primary | ICD-10-CM

## 2019-04-02 DIAGNOSIS — G89.29 CHRONIC PAIN OF RIGHT KNEE: Primary | ICD-10-CM

## 2019-04-02 PROCEDURE — 97161 PT EVAL LOW COMPLEX 20 MIN: CPT | Mod: PN

## 2019-04-02 PROCEDURE — 97110 THERAPEUTIC EXERCISES: CPT | Mod: PN

## 2019-04-02 NOTE — PLAN OF CARE
TIME RECORD    Date: 04/02/2019    Start Time:  1110  Stop Time:  1155    PROCEDURES:    TIMED  Procedure Time Min.   There ex Start:1140  Stop:1150     Start:  Stop:     Start:  Stop:     Start:  Stop:          UNTIMED  Procedure Time Min.   eval Start:1110  Stop:1140     Start:  Stop:      Total Timed Minutes:  15  Total Timed Units:  1  Total Untimed Units:  1  Charges Billed/# of units:  2    OUTPATIENT PHYSICAL THERAPY   PATIENT EVALUATION  Onset Date: 11/01/18  Primary Diagnosis:   1. Chronic pain of right knee     2. Right leg weakness       Treatment Diagnosis: debility due to right knee pain  Past Medical History:   Diagnosis Date    Anemia      Precautions: none  Prior Therapy: for left knee (similar condition)  Medications: Tahira Jenkins has a current medication list which includes the following prescription(s): amlodipine, b complex vitamins, diclofenac sodium, estradiol 0.05 mg/24 hr td ptsw, ferrous gluconate, furosemide, ibuprofen, meloxicam, multivitamin, oxybutynin, and tramadol, and the following Facility-Administered Medications: testosterone cypionate.  Nutrition:  Overweight  History of Present Illness: reports hx of sarah. knee pain since November 2018; imaging reveals mild DJD  Prior Level of Function: Independent  Social History: works as a manager at [employer]  Place of Residence (Steps/Adaptations): lives w/ family in 1-story home (no steps)  Functional Deficits Leading to Referral/Nature of Injury: difficulty performing everyday activities  Patient Therapy Goals: decrease c/o pain    Subjective     Tahira Jenkins states Tahira Jenkins states that her rt knee pain limits her ability to perform her everyday activities.    Pain:  Location: rt knee  Description: Aching and Dull  Activities Which Increase Pain: Standing and Walking  Activities Which Decrease Pain: rest  Pain Scale: 1/10 at best 2/10 now  4/10 at worst    Objective     Posture: guarded due to  pain  Palpation: pain w/ palpation to affected area  Sensation: intact  DTRs:  Range of Motion/Strength: MMT = 4-/5 rt knee and hip, grossly 4/5 throughout rest of sarah. LE's; AROM = WFL throughout    Flexibility: WFL  Gait: Without AD  Analysis: decreased wt bearing rt LE = decreased stride length lt LE  Bed Mobility: NT  Transfers: Independent  Special Tests: LEFS = 66/80  Other:   Treatment: kinesio taping = U-strip rt knee; x 10 min NuStep (L3)    Assessment       Initial Assessment (Pertinent finding, problem list and factors affecting outcome): presents w/ pain in rt knee that limits pt's ability to perform her everyday activities  Rehab Potiential: good    Short Term Goals (3 Weeks):   1.  Radha. tx session w/out increase in symptoms  2.  Decrease c/o pain to 1/10    Long Term Goals (5 Weeks):   1.  Improve rt hip and knee MMT 1/2 grade  2.  Improve LEFS score to 70/80  3.  Demo comp w/ HEP    Plan     Certification Period: 04/02/19 to 05/04/19  Recommended Treatment Plan: 2 times per week for 5 weeks: Electrical Stimulation for pain, Gait Training, Manual Therapy, Moist Heat/ Ice, Patient Education, Therapeutic Activites, Therapeutic Exercise, Ultrasound and HEP  Other Recommendations: NA      Therapist: Agus Thurston, PT    I CERTIFY THE NEED FOR THESE SERVICES FURNISHED UNDER THIS PLAN OF TREATMENT AND WHILE UNDER MY CARE    Physician's comments: ________________________________________________________________________________________________________________________________________________      Physician's Name: ___________________________________

## 2019-04-09 ENCOUNTER — CLINICAL SUPPORT (OUTPATIENT)
Dept: REHABILITATION | Facility: HOSPITAL | Age: 54
End: 2019-04-09
Attending: PHYSICIAN ASSISTANT
Payer: COMMERCIAL

## 2019-04-09 DIAGNOSIS — M25.561 CHRONIC PAIN OF RIGHT KNEE: ICD-10-CM

## 2019-04-09 DIAGNOSIS — G89.29 CHRONIC PAIN OF RIGHT KNEE: ICD-10-CM

## 2019-04-09 DIAGNOSIS — M25.461 EFFUSION OF RIGHT KNEE: ICD-10-CM

## 2019-04-09 DIAGNOSIS — R29.898 RIGHT LEG WEAKNESS: ICD-10-CM

## 2019-04-09 PROCEDURE — 97110 THERAPEUTIC EXERCISES: CPT | Mod: PN

## 2019-04-09 RX ORDER — DICLOFENAC SODIUM 10 MG/G
GEL TOPICAL
Qty: 1 TUBE | Refills: 0 | Status: SHIPPED | OUTPATIENT
Start: 2019-04-09

## 2019-04-09 NOTE — PROGRESS NOTES
"[id number]  Date of Treatment: 04/09/2019   Diagnosis:   Encounter Diagnosis   Name Primary?    Right leg weakness        Time in: 1102  Time Out: 1200  Total Treatment Time: 32    Subjective:    Tahira reports R knee discomfort.  Patient reports their pain to be 2/10 on a 0-10 scale with 0 being no pain and 10 being the worst pain imaginable.    Objective:    Patient received individual therapy to increase strength, endurance, ROM, flexibility, posture and core stabilization with activities as follows:     Tahira received therapeutic exercises to develop strength, endurance, ROM, flexibility, posture and core stabilization for 32 minutes including:     Standing ex's 10/2:     Airex HR/TR B     Airex minisquats B     Step up fwd 4" L     Supine TE 10/2:     HS L     SLR L     SAQ B     QS B     Hip aBd/add    Shuttle B leg press 62# 20/2  Shuttle B calf press 62# 20/2  DF-100 knee curls 22# 20/2 B     Continue HEP daily.    Pt demo good understanding of the education provided. Patient demonstrated good return demonstration of activities.     Assessment:     Strength deficit B LE's with notable difficulty with transferring supine-sit and sit-stand-discussed with Agus, PT.     Pt will continue to benefit from skilled PT intervention. Medical Necessity is demonstrated by:  Requires skilled supervision to complete and progress HEP and Weakness.    Patient is making good progress towards established goals.    Plan:    Continue with established Plan of Care towards PT goals.     "

## 2019-04-12 ENCOUNTER — CLINICAL SUPPORT (OUTPATIENT)
Dept: REHABILITATION | Facility: HOSPITAL | Age: 54
End: 2019-04-12
Attending: PHYSICIAN ASSISTANT
Payer: COMMERCIAL

## 2019-04-12 DIAGNOSIS — R29.898 RIGHT LEG WEAKNESS: ICD-10-CM

## 2019-04-12 PROCEDURE — 97110 THERAPEUTIC EXERCISES: CPT | Mod: PN

## 2019-04-12 NOTE — PROGRESS NOTES
"Name: [facility] Number: [id number]  Date of Treatment: 04/12/2019   Diagnosis:   Encounter Diagnosis   Name Primary?    Right leg weakness        Time in: 1015  Time Out: 1105  Total Treatment Time: 50  Group Time: 35      Subjective:    Tahira reports no soreness after last session, and no changes since eval..  Patient reports their pain to be 1.5/10 on a 0-10 scale with 0 being no pain and 10 being the worst pain imaginable.    Objective  Patient received group therapy to increase strength, endurance, ROM and flexibility with 1 patients for 50 min with activities as follows:   Standing ex's 10/2:    Airex HR/TR B    Airex minisquats B    Step up fwd 4" L     Supine TE 10/2:     SLR L     SAQ B     QS B       Shuttle B leg press 62# 10/3  Shuttle B calf press 62# 10/3  DF-100 knee curls/ext 22# 10/3 B        Written Home Exercises Provided: Cont with HEP  Pt demo good understanding of the education provided. Tahira demonstrated good return demonstration of activities.     Assessment:   Patient with good tolerance with therex, no c/o increase in s/s.    Pt will continue to benefit from skilled PT intervention. Medical Necessity is demonstrated by:  Continued inability to participate in vocational pursuits, Pain limits function of effected part for some activities, Requires skilled supervision to complete and progress HEP and Weakness.    Patient is making good progress towards established goals.    Plan:  Continue with established Plan of Care towards PT goals.   "

## 2019-04-16 ENCOUNTER — CLINICAL SUPPORT (OUTPATIENT)
Dept: REHABILITATION | Facility: HOSPITAL | Age: 54
End: 2019-04-16
Attending: PHYSICIAN ASSISTANT
Payer: COMMERCIAL

## 2019-04-16 DIAGNOSIS — M25.561 RIGHT KNEE PAIN, UNSPECIFIED CHRONICITY: ICD-10-CM

## 2019-04-16 DIAGNOSIS — R29.898 RIGHT LEG WEAKNESS: ICD-10-CM

## 2019-04-16 PROCEDURE — 97110 THERAPEUTIC EXERCISES: CPT | Mod: PN

## 2019-04-16 NOTE — PROGRESS NOTES
"[id number]  Date of Treatment: 04/16/2019   Diagnosis:   Encounter Diagnoses   Name Primary?    Right leg weakness     Right knee pain, unspecified chronicity        Time in: 1315  Time Out: 1400  Total Treatment Time: 45    Subjective:    Tahira reports improvement of symptoms.  Patient denies pain. Late arrival. Did not work today. Reports continues HEP daily without problems. States she has had significant change in her ohysical abilities since November 2018 with difficulty in bed mobility, getting out of bed, and dressing.     Objective:    Patient received individual therapy to increase strength, endurance, ROM, flexibility, posture and core stabilization with activities as follows:     Tahira received therapeutic exercises to develop strength, endurance, ROM, flexibility, posture and core stabilization for 45 minutes including:     Standing ex's 10/2:     Airex HR/TR B     Airex minisquats B     Step up fwd 4" L     SLS L./R on blue pad 3/30s     Supine TE 10/2:     HS R     SLR R     SAQ B     QS B     Hip aBd/add      L SL R hip aBd 10       Shuttle B leg press 62# 20/2  Shuttle B calf press 62# 20/2       Continue HEP daily.    Pt demo good understanding of the education provided. Patient demonstrated good return demonstration of activities.     Assessment:     Notable difficulty with bed mobility. Discussed with Agus, PT and instructed pt to notify MD.     Pt will continue to benefit from skilled PT intervention. Medical Necessity is demonstrated by:  Requires skilled supervision to complete and progress HEP and Weakness.    Patient is making good progress towards established goals.    Plan:    Continue with established Plan of Care towards PT goals.     "

## 2019-04-17 ENCOUNTER — CLINICAL SUPPORT (OUTPATIENT)
Dept: REHABILITATION | Facility: HOSPITAL | Age: 54
End: 2019-04-17
Attending: PHYSICIAN ASSISTANT
Payer: COMMERCIAL

## 2019-04-17 DIAGNOSIS — R29.898 RIGHT LEG WEAKNESS: ICD-10-CM

## 2019-04-17 DIAGNOSIS — M25.561 RIGHT KNEE PAIN, UNSPECIFIED CHRONICITY: ICD-10-CM

## 2019-04-17 PROCEDURE — 97110 THERAPEUTIC EXERCISES: CPT | Mod: PN

## 2019-04-17 NOTE — PROGRESS NOTES
"Name: Tahira Jenkins  [facility] Number: [id number]  Date of Treatment: 04/17/2019   Diagnosis:   Encounter Diagnoses   Name Primary?    Right leg weakness     Right knee pain, unspecified chronicity        Time in: 0715  Time Out: 0817  Total Treatment Time: 62      Subjective:    Tahira reports improvement of symptoms, decreased pain and "It only really hurts when I wake up in the morning and after my stretches it gets better."  Patient reports their pain to be 0/10 on a 0-10 scale with 0 being no pain and 10 being the worst pain imaginable.    Objective  Tahira received therapeutic exercises to develop strength, endurance, ROM and flexibility for 62 minutes including:     Standing ex's 10/3:     Airex HR/TR B     Airex minisquats B     Step up fwd 6 " L     SLS L./R on blue pad 3/30s     Supine TE 10/3:     HS R     SLR R 2#     SAQ R 2#     QS B      L SL R hip aBd 10       Shuttle B leg press 62# 20/2  Shuttle B calf press 62# 20/2    DF-100 flexion/ext 22# 10/3    Written Home Exercises Provided: Cont with HEP  Pt demo good understanding of the education provided. Tahira demonstrated good return demonstration of activities.     Assessment:   Patient fatigued quickly with S/L TE, tolerated all other without difficulty.    Pt will continue to benefit from skilled PT intervention. Medical Necessity is demonstrated by:  Continued inability to participate in vocational pursuits, Pain limits function of effected part for some activities, Requires skilled supervision to complete and progress HEP and Weakness.    Patient is making good progress towards established goals.    Plan:  Continue with established Plan of Care towards PT goals.   "

## 2019-04-18 ENCOUNTER — CLINICAL SUPPORT (OUTPATIENT)
Dept: OBSTETRICS AND GYNECOLOGY | Facility: CLINIC | Age: 54
End: 2019-04-18
Payer: COMMERCIAL

## 2019-04-18 PROCEDURE — 96372 THER/PROPH/DIAG INJ SC/IM: CPT | Mod: S$GLB,,, | Performed by: OBSTETRICS & GYNECOLOGY

## 2019-04-18 PROCEDURE — 96372 PR INJECTION,THERAP/PROPH/DIAG2ST, IM OR SUBCUT: ICD-10-PCS | Mod: S$GLB,,, | Performed by: OBSTETRICS & GYNECOLOGY

## 2019-04-18 RX ADMIN — TESTOSTERONE CYPIONATE 50 MG: 200 INJECTION, SOLUTION INTRAMUSCULAR at 03:04

## 2019-04-18 NOTE — PROGRESS NOTES
MEDICATION DOCUMENTATION    Date: 4/18/2019          Time:  3:04 pm       DEPARTMENT: OB/GYN    ORDERING PHYSICIAN:  Dr. Aden    Order Type: Written order    MEDICATION: Testosterone      SITE/ROUTE:   Right Gluteus    LOT#: [id number]    : [employer]  Expiration Date: 04/2021    Requested patient to remain 15 minutes.    PRE PAIN SCALE: None    POST PAIN SCALE: None    Clinic Supplied

## 2019-04-22 ENCOUNTER — CLINICAL SUPPORT (OUTPATIENT)
Dept: REHABILITATION | Facility: HOSPITAL | Age: 54
End: 2019-04-22
Attending: PHYSICIAN ASSISTANT
Payer: COMMERCIAL

## 2019-04-22 DIAGNOSIS — R29.898 RIGHT LEG WEAKNESS: ICD-10-CM

## 2019-04-22 DIAGNOSIS — M25.561 RIGHT KNEE PAIN, UNSPECIFIED CHRONICITY: ICD-10-CM

## 2019-04-22 PROCEDURE — 97110 THERAPEUTIC EXERCISES: CPT | Mod: PN

## 2019-04-22 NOTE — PROGRESS NOTES
"Name: Tahira Jenkins  [facility] Number: [id number]  Date of Treatment: 04/22/2019   Diagnosis:   Encounter Diagnoses   Name Primary?    Right leg weakness     Right knee pain, unspecified chronicity        Time in: 1558  Time Out: 1655  Total Treatment Time: 57        Subjective:    Tahira reports no pain today.  Patient reports their pain to be 0/10 on a 0-10 scale with 0 being no pain and 10 being the worst pain imaginable.    Objective    Tahira received therapeutic exercises to develop strength, endurance, ROM and flexibility for 57 minutes including:     nustep x 10 min L-5  Seated HSS 3 x 30 sec  GSS 3 x 30 sec 1/2 roll   Airex HR/TR B  Airex minisquats B  Step up fwd 6" R LE  SLS L./R on blue pad 3/30s  HS R 2# x 30 reps  SLR R 2# 3 x 10 reps   SAQ R 2# x 30 reps R LE  QS R x 30 reps  S/L R hip aBd 10  S/L R hip add unable to lift LE  Hip add with ball x 30 reps    Shuttle B leg press 62# 20/2  Shuttle B calf press 62# 20/2   DF-100 flexion/ext 22# 10/3     Pt demo good understanding of the education provided. Tahira demonstrated good return demonstration of activities.     Assessment:       Pt will continue to benefit from skilled PT intervention. Medical Necessity is demonstrated by:  Pain limits function of effected part for some activities, Unable to participate fully in daily activities, Requires skilled supervision to complete and progress HEP and Weakness.    Patient is making good progress towards established goals.          Plan:  Continue with established Plan of Care towards PT goals.   "

## 2019-04-24 ENCOUNTER — OFFICE VISIT (OUTPATIENT)
Dept: INTERNAL MEDICINE | Facility: CLINIC | Age: 54
End: 2019-04-24
Attending: FAMILY MEDICINE
Payer: COMMERCIAL

## 2019-04-24 VITALS
BODY MASS INDEX: 34.6 KG/M2 | HEART RATE: 98 BPM | DIASTOLIC BLOOD PRESSURE: 100 MMHG | SYSTOLIC BLOOD PRESSURE: 178 MMHG | HEIGHT: 67 IN | WEIGHT: 220.44 LBS | OXYGEN SATURATION: 98 %

## 2019-04-24 DIAGNOSIS — R60.9 DEPENDENT EDEMA: ICD-10-CM

## 2019-04-24 DIAGNOSIS — G89.29 CHRONIC PAIN OF BOTH KNEES: Primary | ICD-10-CM

## 2019-04-24 DIAGNOSIS — I10 ESSENTIAL HYPERTENSION: ICD-10-CM

## 2019-04-24 DIAGNOSIS — M25.562 CHRONIC PAIN OF BOTH KNEES: Primary | ICD-10-CM

## 2019-04-24 DIAGNOSIS — M25.561 CHRONIC PAIN OF BOTH KNEES: Primary | ICD-10-CM

## 2019-04-24 PROCEDURE — 99214 PR OFFICE/OUTPT VISIT, EST, LEVL IV, 30-39 MIN: ICD-10-PCS | Mod: S$GLB,,, | Performed by: FAMILY MEDICINE

## 2019-04-24 PROCEDURE — 99999 PR PBB SHADOW E&M-EST. PATIENT-LVL III: CPT | Mod: PBBFAC,,, | Performed by: FAMILY MEDICINE

## 2019-04-24 PROCEDURE — 99999 PR PBB SHADOW E&M-EST. PATIENT-LVL III: ICD-10-PCS | Mod: PBBFAC,,, | Performed by: FAMILY MEDICINE

## 2019-04-24 PROCEDURE — 99214 OFFICE O/P EST MOD 30 MIN: CPT | Mod: S$GLB,,, | Performed by: FAMILY MEDICINE

## 2019-04-24 RX ORDER — NABUMETONE 500 MG/1
500 TABLET, FILM COATED ORAL 2 TIMES DAILY
Qty: 60 TABLET | Refills: 3 | Status: SHIPPED | OUTPATIENT
Start: 2019-04-24 | End: 2019-05-24

## 2019-04-24 RX ORDER — CHLORTHALIDONE 25 MG/1
25 TABLET ORAL DAILY
Qty: 30 TABLET | Refills: 11 | Status: SHIPPED | OUTPATIENT
Start: 2019-04-24 | End: 2019-05-24

## 2019-04-24 NOTE — PROGRESS NOTES
"CHIEF COMPLAINT:  Follow-up lower extremity edema and knee pain     HISTORY OF PRESENT ILLNESS: The patient is a healthy 53-year-old black female.       Pt has complaints about worsening bilateral calf and foot swelling that resolved with furosemide. There is no SOB, no neurological symptoms.      Since the edema has resolved she has noted continued bilateral knee pain without effusions.  I suspect she has osteoarthritis.     She has HTN and currently on Norvasc for control.      REVIEW OF SYSTEMS:  GENERAL: No fever, chills, fatigability or weight loss.  SKIN: No rashes, itching or changes in color or texture of skin.  HEAD: No headaches or recent head trauma.  EYES: Visual acuity fine. No photophobia, ocular pain or diplopia.  EARS: Denies ear pain, discharge or vertigo.  NOSE: No loss of smell, no epistaxis or postnasal drip.  MOUTH & THROAT: No hoarseness or change in voice. No excessive gum bleeding.  NODES: Denies swollen glands.  CHEST: Denies COSTA, cyanosis, wheezing, cough and sputum production.  CARDIOVASCULAR: Denies chest pain, PND, orthopnea or reduced exercise tolerance.  ABDOMEN: Appetite fine. No weight loss. Denies diarrhea, abdominal pain, hematemesis or blood in stool.  URINARY: No flank pain, dysuria or hematuria.  PERIPHERAL VASCULAR: No claudication or cyanosis.  MUSCULOSKELETAL: No joint stiffness. Denies back pain.  Except as above  NEUROLOGIC: No history of seizures, paralysis, alteration of gait or coordination.     SOCIAL HISTORY: The patient does not smoke.  The patient consumes alcohol socially.  The patient works at [employer].     PHYSICAL EXAMINATION:   Blood pressure (!) 178/100, pulse 98, height 5' 7" (1.702 m), weight 100 kg (220 lb 7.4 oz), last menstrual period 11/23/2015, SpO2 98 %.    APPEARANCE: Well nourished, well developed, in no acute distress.    HEAD: Normocephalic, atraumatic.  EYES: PERRL. EOMI.  Conjunctivae without injection and  anicteric  EARS: TM's intact. Light " reflex normal. No retraction or perforation.    NOSE: Mucosa pink. Airway clear.  MOUTH & THROAT: No tonsillar enlargement. No pharyngeal erythema or exudate. No stridor.  NECK: Supple.   NODES: No cervical, axillary or inguinal lymph node enlargement.  CHEST: Lungs clear to auscultation.  No retractions are noted.  No rales or rhonchi are present.  CARDIOVASCULAR: Normal S1, S2. No rubs, murmurs or gallops.  ABDOMEN: Bowel sounds normal. Not distended. Soft. No tenderness or masses.  No ascites is noted.  MUSCULOSKELETAL:  There is no clubbing, cyanosis, or edema of the extremities x4.  There is full range of motion of the lumbar spine.  There is full range of motion of the extremities x4.  There is no deformity noted.    NEUROLOGIC:       Normal speech development.      Hearing normal.      Normal gait.      Motor and sensory exams grossly normal.  PSYCHIATRIC: Patient is alert and oriented x3.  Thought processes are all normal.  There is no homicidality.  There is no suicidality.  There is no evidence of psychosis.     LABORATORY/RADIOLOGY:   Chart reviewed.     ASSESSMENT:   Oedema resolved on Lasix  Bilateral knee pain  Essential hypertension, not well controlled    Fatigue, unspecified type: due to nocturia and difficulty sleeping  Nocturia     PLAN:  Stop Lasix  Start Chlorthalidone  Stop Mobic  Start Relafen  Continue current medications otherwise  Follow-up in about 4 weeks for BP visit and consider Metoprolol or losartan if needed

## 2019-04-25 ENCOUNTER — CLINICAL SUPPORT (OUTPATIENT)
Dept: REHABILITATION | Facility: HOSPITAL | Age: 54
End: 2019-04-25
Attending: PHYSICIAN ASSISTANT
Payer: COMMERCIAL

## 2019-04-25 DIAGNOSIS — M25.561 CHRONIC PAIN OF RIGHT KNEE: Primary | ICD-10-CM

## 2019-04-25 DIAGNOSIS — R29.898 RIGHT LEG WEAKNESS: ICD-10-CM

## 2019-04-25 DIAGNOSIS — G89.29 CHRONIC PAIN OF RIGHT KNEE: Primary | ICD-10-CM

## 2019-04-25 PROCEDURE — 97110 THERAPEUTIC EXERCISES: CPT | Mod: PN

## 2019-04-25 NOTE — PROGRESS NOTES
Name: Tahira Jenkins  Clinic Number: [id number]  Date of Treatment: 04/25/2019   Diagnosis:   Encounter Diagnoses   Name Primary?    Chronic pain of right knee Yes    Right leg weakness        Time in: 0930  Time Out: 1000  Total Treatment Time: 30  Group Time: NA      Subjective:    Tahira reports worsening of symptoms in rt knee.  Patient reports their pain to be 6/10 on a 0-10 scale with 0 being no pain and 10 being the worst pain imaginable.    Objective    Tahira received therapeutic exercises to develop strength and endurance for 30 minutes including:     X 10 min NuStep (L3)  X 20 seated sarah. LE there ex = marching (2#), LAQ (2#), ball sq, hip ABD (RTB)  X 20 ea shuttle mini sq and HR (43#)  X 20 [employer] knee curl (22#)  X 10 min ice rt knee    Assessment:      was able to carlos. tx session w/out increase in symptoms.    Pt will continue to benefit from skilled PT intervention. Medical Necessity is demonstrated by:  Pain limits function of effected part for some activities, Unable to participate fully in daily activities and Weakness.    Patient is making fair progress towards established goals.    New/Revised goals: NA      Plan:  Continue with established Plan of Care towards PT goals.

## 2019-04-29 ENCOUNTER — CLINICAL SUPPORT (OUTPATIENT)
Dept: REHABILITATION | Facility: HOSPITAL | Age: 54
End: 2019-04-29
Attending: PHYSICIAN ASSISTANT
Payer: COMMERCIAL

## 2019-04-29 DIAGNOSIS — M25.561 CHRONIC PAIN OF RIGHT KNEE: Primary | ICD-10-CM

## 2019-04-29 DIAGNOSIS — R29.898 RIGHT LEG WEAKNESS: ICD-10-CM

## 2019-04-29 DIAGNOSIS — G89.29 CHRONIC PAIN OF RIGHT KNEE: Primary | ICD-10-CM

## 2019-04-29 PROCEDURE — 97110 THERAPEUTIC EXERCISES: CPT | Mod: KX,PN

## 2019-04-29 NOTE — PROGRESS NOTES
"Name: Tahira Jenkins  [facility] Number: [id number]  Date of Treatment: 04/29/2019   Diagnosis:   Encounter Diagnoses   Name Primary?    Right leg weakness     Chronic pain of right knee Yes       Time in: 1505  Time Out: 1600  Total Treatment Time: 27    Subjective:    Tahira reports lat R knee discomfort.  Patient reports their pain to be 3/10 on a 0-10 scale with 0 being no pain and 10 being the worst pain imaginable.    Objective:    Patient received individual therapy to increase strength, endurance, ROM, flexibility, posture and core stabilization with activities as follows:     Tahira received therapeutic exercises to develop strength, endurance, ROM, flexibility, posture and core stabilization for 27 minutes including:     Standing ex's 10/2:     Airex HR/TR B     Airex minisquats B     Step up fwd 6" R     Step up lat R to 6" 10/2     SLS L./R on blue pad 3/30s     Supine TE 10/2:     HS R     SLR R     SAQ B     QS B     Ballsqueeze    Shuttle B leg press 43# 10/2  Shuttle B calf press 43# 10/2      NUStep L3 10' LE's only    Kinesiotaping U strip over R knee for patellar correction    Continue HEP daily.  Instructed proper wear and removal of KT Tape and instructions on immediate removal if irritation, discomfort, or pain occurs.    Pt demo good understanding of the education provided. Patient demonstrated good return demonstration of activities.     Assessment:     Paced for pt comfort. Improved pain after taping.     Pt will continue to benefit from skilled PT intervention. Medical Necessity is demonstrated by:  Requires skilled supervision to complete and progress HEP and Weakness.    Patient is making good progress towards established goals.    Plan:    Continue with established Plan of Care towards PT goals.     "

## 2019-04-30 ENCOUNTER — CLINICAL SUPPORT (OUTPATIENT)
Dept: REHABILITATION | Facility: HOSPITAL | Age: 54
End: 2019-04-30
Attending: PHYSICIAN ASSISTANT
Payer: COMMERCIAL

## 2019-04-30 DIAGNOSIS — R29.898 RIGHT LEG WEAKNESS: ICD-10-CM

## 2019-04-30 DIAGNOSIS — M25.561 CHRONIC PAIN OF RIGHT KNEE: Primary | ICD-10-CM

## 2019-04-30 DIAGNOSIS — G89.29 CHRONIC PAIN OF RIGHT KNEE: Primary | ICD-10-CM

## 2019-04-30 PROCEDURE — 97110 THERAPEUTIC EXERCISES: CPT | Mod: PN

## 2019-04-30 NOTE — PROGRESS NOTES
Patient would benefit from further PT visits to cont. addressing rt knee pain.  Pls see updated POC.

## 2019-04-30 NOTE — PLAN OF CARE
TIME RECORD    Date: 04/30/2019    Start Time:  1010  Stop Time:  1050    PROCEDURES:    TIMED  Procedure Time Min.   There ex Start:1010  Stop:1050     Start:  Stop:     Start:  Stop:     Start:  Stop:          UNTIMED  Procedure Time Min.    Start:  Stop:     Start:  Stop:      Total Timed Minutes:  40  Total Timed Units:  3  Total Untimed Units:  0  Charges Billed/# of units:  3    PHYSICAL THERAPY UPDATED PLAN OF TREATMENT    Patient name: Tahira Jenkins  Onset Date:  11/01/18  SOC Date:  04/02/19  Primary Diagnosis:    1. Chronic pain of right knee     2. Right leg weakness       Treatment Diagnosis:  debility due to right knee pain  Certification Period:  04/02/19 to 05/04/19  Precautions:  none  Visits from SOC:  8  Functional Level Prior to SOC:  Independent    Treatment:    X 10 min NuStep (L3)  X 20 supine rt LE there ex = SLR (1#), SAQ (1#), HS (1#), hip ABD/ADD (1#)  X 20 ea AirEx mini sq and HR   X 20 ea up/down 4-in step = forwards/sideways    Updated Assessment:  reports 4/10 rt knee pain; LEFS = 30/80    Previous Short Term Goals Status:     1.  Radha. tx session w/out increase in symptoms (NOT MET)  2.  Decrease c/o pain to 1/10 (NOT MET)    New Short Term Goals:     1.  Radha. tx session w/out increase in symptoms  2.  Decrease c/o pain to 1/10    Previous Long Term GoalsStatus:     1.  Improve rt hip and knee MMT 1/2 grade (NOT MET)  2.  Improve LEFS score to 70/80 (NOT MET)  3.  Demo comp w/ HEP (NOT MET)    New Long Term Goals:  1.  Improve rt hip and knee MMT 1/2 grade  2.  Improve LEFS score to 70/80  3.  Demo comp w/ HEP    Reasons for Recertification of Therapy:   Patient would benefit from further PT visits to cont. addressing rt knee pain.    Certification Period: 04/30/19 to 06/01/19  Recommended Treatment Plan: 2 times per week for 5 weeks: Electrical Stimulation for pain, Gait Training, Manual Therapy, Moist Heat/ Ice, Patient Education, Therapeutic Activites, Therapeutic Exercise,  Ultrasound and HEP  Other Recommendations: Dry Needling        Therapist's Name: Agus Thurston PT   Date: 04/30/2019    I CERTIFY THE NEED FOR THESE SERVICES FURNISHED UNDER THIS PLAN OF TREATMENT AND WHILE UNDER MY CARE    Physician's comments: ________________________________________________________________________________________________________________________________________________      Physician's Name: ___________________________________

## 2019-05-02 ENCOUNTER — CLINICAL SUPPORT (OUTPATIENT)
Dept: REHABILITATION | Facility: HOSPITAL | Age: 54
End: 2019-05-02
Attending: PHYSICIAN ASSISTANT
Payer: COMMERCIAL

## 2019-05-02 DIAGNOSIS — R29.898 RIGHT LEG WEAKNESS: ICD-10-CM

## 2019-05-02 PROCEDURE — 97110 THERAPEUTIC EXERCISES: CPT | Mod: PN

## 2019-05-02 PROCEDURE — 97010 HOT OR COLD PACKS THERAPY: CPT | Mod: PN

## 2019-05-02 NOTE — PROGRESS NOTES
"Name: Tahira Starkey [facility] Number: [id number]  Date of Treatment: 05/02/2019   Diagnosis:   Encounter Diagnosis   Name Primary?    Right leg weakness        Time in: 0715  Time Out: 0810  Total Treatment Time: 55      Subjective:    Tahira reports "I am always stiff and sore in the mornings."  Patient reports compliance with HEP.  Patient reports their pain to be 4/10 on a 0-10 scale with 0 being no pain and 10 being the worst pain imaginable.    Objective  Tahira received therapeutic exercises to develop strength, endurance, ROM and flexibility for 45 minutes including:     X 10 min NuStep (L3)  X 10 Heel slides  X 3 SLR  X3 hip abd/add  X 20 ea AirEx mini sq and HR   SLS 3/30s R/L  X 20 ea up/down 4-in step = forwards/sideways    CP x 10' to R knee post therex    Written Home Exercises Provided: Cont with HEP  Pt demo good understanding of the education provided. Tahira demonstrated good return demonstration of activities.     Assessment:   Patient c/o pain too bad in back and R knee too bad this am, unable to perform therex as usual.  Patient with inability to perform log roll, even with demonstration. Sits up with straining back after advice and education on not to.    Pt will continue to benefit from skilled PT intervention. Medical Necessity is demonstrated by:  Fall Risk, Continued inability to participate in vocational pursuits, Pain limits function of effected part for some activities, Requires skilled supervision to complete and progress HEP and Weakness.    Patient is making good progress towards established goals.    Plan:  Continue with established Plan of Care towards PT goals.   "

## 2019-05-06 ENCOUNTER — CLINICAL SUPPORT (OUTPATIENT)
Dept: REHABILITATION | Facility: HOSPITAL | Age: 54
End: 2019-05-06
Attending: PHYSICIAN ASSISTANT
Payer: COMMERCIAL

## 2019-05-06 DIAGNOSIS — R29.898 RIGHT LEG WEAKNESS: ICD-10-CM

## 2019-05-06 DIAGNOSIS — M25.561 RIGHT KNEE PAIN, UNSPECIFIED CHRONICITY: Primary | ICD-10-CM

## 2019-05-06 PROCEDURE — 97110 THERAPEUTIC EXERCISES: CPT | Mod: PN

## 2019-05-06 PROCEDURE — 97010 HOT OR COLD PACKS THERAPY: CPT | Mod: PN

## 2019-05-06 NOTE — PROGRESS NOTES
Name: Tahira Jenkins  Clinic Number: [id number]  Date of Treatment: 05/06/2019   Diagnosis:   Encounter Diagnoses   Name Primary?    Right knee pain, unspecified chronicity Yes    Right leg weakness        Time in: 1515  Time Out: 1615  Total Treatment Time: 60  Group Time: NA      Subjective:    Tahira reports improvement of symptoms in rt knee.  Patient reports their pain to be 3/10 on a 0-10 scale with 0 being no pain and 10 being the worst pain imaginable.    Objective    Tahira received therapeutic exercises to develop strength and endurance for 60 minutes including:     X 10 min NuStep (L4)  X 20 seated sarah. LE there ex = marching (2#), LAQ (2#), ball sq, hip ABD (GTB)  X 20 supine rt LE there ex = SLR (2#), SAQ (2#), HS (2#), hip ABD/ADD (2#)  X 20 ea AirEx mini sq and HR   X 20 ea up/down 6-in step = forwards/sideways  X 12 min ice rt knee    Assessment:      was able to carlos. tx session w/out increase in symptoms.    Pt will continue to benefit from skilled PT intervention. Medical Necessity is demonstrated by:  Pain limits function of effected part for some activities, Unable to participate fully in daily activities and Weakness.    Patient is making fair progress towards established goals.    New/Revised goals: NA      Plan:  Continue with established Plan of Care towards PT goals.

## 2019-05-10 ENCOUNTER — CLINICAL SUPPORT (OUTPATIENT)
Dept: REHABILITATION | Facility: HOSPITAL | Age: 54
End: 2019-05-10
Attending: PHYSICIAN ASSISTANT
Payer: COMMERCIAL

## 2019-05-10 DIAGNOSIS — M25.561 RIGHT KNEE PAIN, UNSPECIFIED CHRONICITY: ICD-10-CM

## 2019-05-10 DIAGNOSIS — R29.898 RIGHT LEG WEAKNESS: ICD-10-CM

## 2019-05-10 PROCEDURE — 97010 HOT OR COLD PACKS THERAPY: CPT | Mod: PN

## 2019-05-10 PROCEDURE — 97110 THERAPEUTIC EXERCISES: CPT | Mod: PN

## 2019-05-13 ENCOUNTER — CLINICAL SUPPORT (OUTPATIENT)
Dept: REHABILITATION | Facility: HOSPITAL | Age: 54
End: 2019-05-13
Attending: PHYSICIAN ASSISTANT
Payer: COMMERCIAL

## 2019-05-13 DIAGNOSIS — R29.898 RIGHT LEG WEAKNESS: ICD-10-CM

## 2019-05-13 DIAGNOSIS — M25.561 RIGHT KNEE PAIN, UNSPECIFIED CHRONICITY: Primary | ICD-10-CM

## 2019-05-13 PROCEDURE — 97110 THERAPEUTIC EXERCISES: CPT | Mod: PN

## 2019-05-13 PROCEDURE — 97140 MANUAL THERAPY 1/> REGIONS: CPT | Mod: PN

## 2019-05-13 NOTE — PROGRESS NOTES
Name: Tahira Jenkins  Clinic Number: [id number]  Date of Treatment: 05/13/2019   Diagnosis:   Encounter Diagnoses   Name Primary?    Right knee pain, unspecified chronicity Yes    Right leg weakness        Time in: 1615  Time Out: 1700  Total Treatment Time: 45  Group Time: NA      Subjective:    Tahira reports worsening of symptoms.  Patient reports their pain to be 5/10 on a 0-10 scale with 0 being no pain and 10 being the worst pain imaginable.    Objective    Tahira received therapeutic exercises to develop strength and endurance for 45 minutes including:     X 10 min NuStep (L4)  kinesio taping U-strip sarah. knees  X 20 seated sarah. LE there ex = marching (2#), LAQ (2#), ball sq, hip ABD (RTB)  X 20 ea shuttle mini sq and HR (50#)  X 20 SportsArt knee curl (22#)    Assessment:      was able to carlos. tx session w/out increase in symptoms.    Pt will continue to benefit from skilled PT intervention. Medical Necessity is demonstrated by:  Pain limits function of effected part for some activities, Unable to participate fully in daily activities, Weakness and Edema.    Patient is making fair progress towards established goals.    New/Revised goals: NA      Plan:  Continue with established Plan of Care towards PT goals.

## 2019-05-15 ENCOUNTER — CLINICAL SUPPORT (OUTPATIENT)
Dept: REHABILITATION | Facility: HOSPITAL | Age: 54
End: 2019-05-15
Attending: PHYSICIAN ASSISTANT
Payer: COMMERCIAL

## 2019-05-15 DIAGNOSIS — R29.898 RIGHT LEG WEAKNESS: ICD-10-CM

## 2019-05-15 DIAGNOSIS — M25.561 RIGHT KNEE PAIN, UNSPECIFIED CHRONICITY: Primary | ICD-10-CM

## 2019-05-15 PROCEDURE — 97110 THERAPEUTIC EXERCISES: CPT | Mod: PN

## 2019-05-15 NOTE — PROGRESS NOTES
Name: Tahira Jenkins  Clinic Number: [id number]  Date of Treatment: 05/15/2019   Diagnosis:   Encounter Diagnoses   Name Primary?    Right knee pain, unspecified chronicity Yes    Right leg weakness        Time in: 1510  Time Out: 1600  Total Treatment Time: 50  Group Time: NA      Subjective:    Tahira reports improvement of symptoms.  Patient reports their pain to be 3/10 on a 0-10 scale with 0 being no pain and 10 being the worst pain imaginable.    Objective    Tahira received therapeutic exercises to develop strength, endurance and balance for 50 minutes including:     X 10 min NuStep (L4)  X 20 ea AirEx mini sq & HR/TR  X 20 ea up/down 6-in step = forwards/sideways  X 20 seated sarah. LE there ex = marching (2#), LAQ (2#), ball sq, hip ABD (GTB)  X 20 ea shuttle mini sq and HR (56#)  X 20 SportsArt knee curl (22#)    Assessment:      was able to carlos. tx session w/out increase in symptoms.    Pt will continue to benefit from skilled PT intervention. Medical Necessity is demonstrated by:  Pain limits function of effected part for some activities, Unable to participate fully in daily activities and Weakness.    Patient is making fair progress towards established goals.    New/Revised goals: NA      Plan:  Continue with established Plan of Care towards PT goals.

## 2019-05-17 ENCOUNTER — CLINICAL SUPPORT (OUTPATIENT)
Dept: OBSTETRICS AND GYNECOLOGY | Facility: CLINIC | Age: 54
End: 2019-05-17
Payer: COMMERCIAL

## 2019-05-17 PROCEDURE — 96372 PR INJECTION,THERAP/PROPH/DIAG2ST, IM OR SUBCUT: ICD-10-PCS | Mod: S$GLB,,, | Performed by: OBSTETRICS & GYNECOLOGY

## 2019-05-17 PROCEDURE — 99999 PR PBB SHADOW E&M-EST. PATIENT-LVL I: CPT | Mod: PBBFAC,,,

## 2019-05-17 PROCEDURE — 96372 THER/PROPH/DIAG INJ SC/IM: CPT | Mod: S$GLB,,, | Performed by: OBSTETRICS & GYNECOLOGY

## 2019-05-17 PROCEDURE — 99999 PR PBB SHADOW E&M-EST. PATIENT-LVL I: ICD-10-PCS | Mod: PBBFAC,,,

## 2019-05-17 RX ADMIN — TESTOSTERONE CYPIONATE 50 MG: 200 INJECTION, SOLUTION INTRAMUSCULAR at 03:05

## 2019-05-17 NOTE — PROGRESS NOTES
Here for hormone therapy injection, no complaints at this time, Injection given as ordered, tolerated well, no report of pain prior to or after injection. Return to clinic as scheduled.     Site - LB    Testosterone 50 mg    Clinic Supplied Medication

## 2019-05-20 ENCOUNTER — CLINICAL SUPPORT (OUTPATIENT)
Dept: REHABILITATION | Facility: HOSPITAL | Age: 54
End: 2019-05-20
Attending: PHYSICIAN ASSISTANT
Payer: COMMERCIAL

## 2019-05-20 DIAGNOSIS — M25.561 RIGHT KNEE PAIN, UNSPECIFIED CHRONICITY: Primary | ICD-10-CM

## 2019-05-20 DIAGNOSIS — R29.898 RIGHT LEG WEAKNESS: ICD-10-CM

## 2019-05-20 PROCEDURE — 97110 THERAPEUTIC EXERCISES: CPT | Mod: PN

## 2019-05-20 PROCEDURE — 97140 MANUAL THERAPY 1/> REGIONS: CPT | Mod: PN

## 2019-05-20 NOTE — PROGRESS NOTES
Name: Tahira Jenkins  Clinic Number: [id number]  Date of Treatment: 05/20/2019   Diagnosis:   Encounter Diagnoses   Name Primary?    Right knee pain, unspecified chronicity Yes    Right leg weakness        Time in: 1715  Time Out: 1800  Total Treatment Time: 45  Group Time: NA      Subjective:    Tahira reports mild to moderate pain levels in sarah. knees.  Moderate edema noted in sarah. supra-patellar areas.  Patient reports their pain to be 3/10 on a 0-10 scale with 0 being no pain and 10 being the worst pain imaginable.    Objective    Tahira received therapeutic exercises to develop strength and endurance for 45 minutes including:     kinesio to sarah. supra-patellar areas = lymphatic correction technique  X 10 min NuStep (L4)  X 20 ea AirEx mini sq & HR/TR  X 20 ea up/down 6-in step = forwards/sideways  X 20 seated sarah. LE there ex = marching (2#), LAQ (2#), ball sq, hip ABD (GTB)    Assessment:      was able to carlos. tx session w/out increase in symptoms.    Pt will continue to benefit from skilled PT intervention. Medical Necessity is demonstrated by:  Pain limits function of effected part for some activities, Unable to participate fully in daily activities and Weakness.    Patient is making fair progress towards established goals.    New/Revised goals: NA      Plan:  Continue with established Plan of Care towards PT goals.

## 2019-05-23 ENCOUNTER — CLINICAL SUPPORT (OUTPATIENT)
Dept: REHABILITATION | Facility: HOSPITAL | Age: 54
End: 2019-05-23
Attending: PHYSICIAN ASSISTANT
Payer: COMMERCIAL

## 2019-05-23 DIAGNOSIS — R29.898 RIGHT LEG WEAKNESS: ICD-10-CM

## 2019-05-23 PROCEDURE — 97110 THERAPEUTIC EXERCISES: CPT | Mod: PN

## 2019-05-23 NOTE — PROGRESS NOTES
"Name: Tahira Jenkins  [facility] Number: [id number]  Date of Treatment: 05/23/2019   Diagnosis:   Encounter Diagnosis   Name Primary?    Right leg weakness        Time in: 1015  Time Out: 1100  Total Treatment Time: 30    Subjective:    Tahira reports has to work today. Feeling so-so. Has some L LB discomfort from "sleeping wrong", which she states is common for her. Late arrival.     Objective:    Patient received individual therapy to increase strength, endurance, ROM, flexibility, posture and core stabilization with activities as follows:     Tahira received therapeutic exercises to develop strength, endurance, ROM, flexibility, posture and core stabilization for 30 minutes including:     Standing ex's 10/2:     Step up fwd 6" R     Step up lat R to 6" 10/2      Seated TE 10/2:     LAQ 2#     March 2#     Ray      DF-100 knee flexion 22# 10/3 B      NUStep L3 10' LE's only    Continue HEP daily.    Pt demo good understanding of the education provided. Patient demonstrated good return demonstration of activities.     Assessment:     Several attempts with each sit-stand using UE's from armchairs with rocking facilitation and LE aDDuction and UE flexion with LE swing with initial gait after prolonged sitting.     Pt will continue to benefit from skilled PT intervention. Medical Necessity is demonstrated by:  Requires skilled supervision to complete and progress HEP and Weakness.    Patient is making good progress towards established goals.    Plan:    Continue with established Plan of Care towards PT goals.     "

## 2019-05-24 ENCOUNTER — OFFICE VISIT (OUTPATIENT)
Dept: INTERNAL MEDICINE | Facility: CLINIC | Age: 54
End: 2019-05-24
Attending: FAMILY MEDICINE
Payer: COMMERCIAL

## 2019-05-24 VITALS
HEART RATE: 95 BPM | OXYGEN SATURATION: 98 % | HEIGHT: 67 IN | BODY MASS INDEX: 33.64 KG/M2 | SYSTOLIC BLOOD PRESSURE: 116 MMHG | WEIGHT: 214.31 LBS | DIASTOLIC BLOOD PRESSURE: 80 MMHG

## 2019-05-24 DIAGNOSIS — R60.9 DEPENDENT EDEMA: Primary | ICD-10-CM

## 2019-05-24 DIAGNOSIS — M25.569 CHRONIC KNEE PAIN, UNSPECIFIED LATERALITY: Primary | ICD-10-CM

## 2019-05-24 DIAGNOSIS — M25.562 CHRONIC PAIN OF BOTH KNEES: ICD-10-CM

## 2019-05-24 DIAGNOSIS — I10 ESSENTIAL HYPERTENSION: ICD-10-CM

## 2019-05-24 DIAGNOSIS — G89.29 CHRONIC KNEE PAIN, UNSPECIFIED LATERALITY: Primary | ICD-10-CM

## 2019-05-24 DIAGNOSIS — M25.561 CHRONIC PAIN OF BOTH KNEES: ICD-10-CM

## 2019-05-24 DIAGNOSIS — G89.29 CHRONIC PAIN OF BOTH KNEES: ICD-10-CM

## 2019-05-24 PROCEDURE — 99214 OFFICE O/P EST MOD 30 MIN: CPT | Mod: S$GLB,,, | Performed by: FAMILY MEDICINE

## 2019-05-24 PROCEDURE — 99999 PR PBB SHADOW E&M-EST. PATIENT-LVL III: ICD-10-PCS | Mod: PBBFAC,,, | Performed by: FAMILY MEDICINE

## 2019-05-24 PROCEDURE — 99214 PR OFFICE/OUTPT VISIT, EST, LEVL IV, 30-39 MIN: ICD-10-PCS | Mod: S$GLB,,, | Performed by: FAMILY MEDICINE

## 2019-05-24 PROCEDURE — 99999 PR PBB SHADOW E&M-EST. PATIENT-LVL III: CPT | Mod: PBBFAC,,, | Performed by: FAMILY MEDICINE

## 2019-05-24 RX ORDER — CHLORTHALIDONE 25 MG/1
25 TABLET ORAL DAILY
Qty: 90 TABLET | Refills: 3 | Status: SHIPPED | OUTPATIENT
Start: 2019-05-24 | End: 2020-05-23

## 2019-05-24 RX ORDER — NABUMETONE 500 MG/1
500 TABLET, FILM COATED ORAL 2 TIMES DAILY
Qty: 180 TABLET | Refills: 3 | Status: SHIPPED | OUTPATIENT
Start: 2019-05-24 | End: 2020-05-23

## 2019-05-24 RX ORDER — TRAMADOL HYDROCHLORIDE 50 MG/1
50 TABLET ORAL EVERY 6 HOURS PRN
Qty: 20 TABLET | Refills: 0 | Status: SHIPPED | OUTPATIENT
Start: 2019-05-24 | End: 2019-05-24 | Stop reason: SDUPTHER

## 2019-05-24 NOTE — PROGRESS NOTES
"CHIEF COMPLAINT:  Follow-up lower extremity edema and knee pain     HISTORY OF PRESENT ILLNESS: The patient is a healthy 53-year-old black female.       Pt has complaints about worsening bilateral calf and foot swelling that resolved with furosemide. There is no SOB, no neurological symptoms.      Since the edema has resolved she has noted continued bilateral knee pain without effusions.  I suspect she has osteoarthritis.     She has HTN and currently on Norvasc for control.      REVIEW OF SYSTEMS:  GENERAL: No fever, chills, fatigability or weight loss.  SKIN: No rashes, itching or changes in color or texture of skin.  HEAD: No headaches or recent head trauma.  EYES: Visual acuity fine. No photophobia, ocular pain or diplopia.  EARS: Denies ear pain, discharge or vertigo.  NOSE: No loss of smell, no epistaxis or postnasal drip.  MOUTH & THROAT: No hoarseness or change in voice. No excessive gum bleeding.  NODES: Denies swollen glands.  CHEST: Denies COSTA, cyanosis, wheezing, cough and sputum production.  CARDIOVASCULAR: Denies chest pain, PND, orthopnea or reduced exercise tolerance.  ABDOMEN: Appetite fine. No weight loss. Denies diarrhea, abdominal pain, hematemesis or blood in stool.  URINARY: No flank pain, dysuria or hematuria.  PERIPHERAL VASCULAR: No claudication or cyanosis.  MUSCULOSKELETAL: No joint stiffness. Denies back pain.  Except as above  NEUROLOGIC: No history of seizures, paralysis, alteration of gait or coordination.     SOCIAL HISTORY: The patient does not smoke.  The patient consumes alcohol socially.  The patient works at [employer].     PHYSICAL EXAMINATION:   Blood pressure 116/80, pulse 95, height 5' 7" (1.702 m), weight 97.2 kg (214 lb 4.6 oz), last menstrual period 11/23/2015, SpO2 98 %.    APPEARANCE: Well nourished, well developed, in no acute distress.    HEAD: Normocephalic, atraumatic.  EYES: PERRL. EOMI.  Conjunctivae without injection and  anicteric  EARS: TM's intact. Light " reflex normal. No retraction or perforation.    NOSE: Mucosa pink. Airway clear.  MOUTH & THROAT: No tonsillar enlargement. No pharyngeal erythema or exudate. No stridor.  NECK: Supple.   NODES: No cervical, axillary or inguinal lymph node enlargement.  CHEST: Lungs clear to auscultation.  No retractions are noted.  No rales or rhonchi are present.  CARDIOVASCULAR: Normal S1, S2. No rubs, murmurs or gallops.  ABDOMEN: Bowel sounds normal. Not distended. Soft. No tenderness or masses.  No ascites is noted.  MUSCULOSKELETAL:  There is no clubbing, cyanosis, or edema of the extremities x4.  There is full range of motion of the lumbar spine.  There is full range of motion of the extremities x4.  There is no deformity noted.    NEUROLOGIC:       Normal speech development.      Hearing normal.      Normal gait.      Motor and sensory exams grossly normal.  PSYCHIATRIC: Patient is alert and oriented x3.  Thought processes are all normal.  There is no homicidality.  There is no suicidality.  There is no evidence of psychosis.     LABORATORY/RADIOLOGY:   Chart reviewed.     ASSESSMENT:   Oedema resolved on chlorthalidone  Bilateral knee pain  Essential hypertension, well controlled on chlorthalidone lung    Fatigue, unspecified type: due to nocturia and difficulty sleeping  Nocturia     PLAN:  Chlorthalidone  Relafen  Continue current medications otherwise  Follow-up in about 1 year

## 2019-05-24 NOTE — TELEPHONE ENCOUNTER
----- Message from Arthur Smiley MD sent at 5/24/2019  4:20 PM CDT -----  Contact: [facility] [employer] pharmacist  Chronic knee pain  ----- Message -----  From: Susanne Bright MA  Sent: 5/24/2019   1:33 PM  To: Arthur Smiley MD    Please advise  ----- Message -----  From: Amina Fowler  Sent: 5/24/2019  12:08 PM  To: [facility] Staff    Name of Who is Calling:Chely        What is the request in detail: Please confirm reason for traMADol (ULTRAM) 50 mg tablet. Contact at your earliest convenience.          Can the clinic reply by [facility]:N     What Number to Call Back if not in [facility]: [phone]

## 2019-05-27 RX ORDER — TRAMADOL HYDROCHLORIDE 50 MG/1
50 TABLET ORAL EVERY 6 HOURS PRN
Qty: 20 TABLET | Refills: 0 | Status: SHIPPED | OUTPATIENT
Start: 2019-05-27 | End: 2019-06-27 | Stop reason: SDUPTHER

## 2019-05-29 ENCOUNTER — CLINICAL SUPPORT (OUTPATIENT)
Dept: REHABILITATION | Facility: HOSPITAL | Age: 54
End: 2019-05-29
Attending: PHYSICIAN ASSISTANT
Payer: COMMERCIAL

## 2019-05-29 DIAGNOSIS — R29.898 RIGHT LEG WEAKNESS: ICD-10-CM

## 2019-05-29 DIAGNOSIS — M25.561 CHRONIC PAIN OF RIGHT KNEE: Primary | ICD-10-CM

## 2019-05-29 DIAGNOSIS — G89.29 CHRONIC PAIN OF RIGHT KNEE: Primary | ICD-10-CM

## 2019-05-29 PROCEDURE — 97110 THERAPEUTIC EXERCISES: CPT | Mod: PN

## 2019-05-29 PROCEDURE — 97140 MANUAL THERAPY 1/> REGIONS: CPT | Mod: PN

## 2019-05-30 ENCOUNTER — CLINICAL SUPPORT (OUTPATIENT)
Dept: REHABILITATION | Facility: HOSPITAL | Age: 54
End: 2019-05-30
Attending: PHYSICIAN ASSISTANT
Payer: COMMERCIAL

## 2019-05-30 DIAGNOSIS — R29.898 RIGHT LEG WEAKNESS: ICD-10-CM

## 2019-05-30 PROCEDURE — 97110 THERAPEUTIC EXERCISES: CPT | Mod: PN

## 2019-05-30 NOTE — PROGRESS NOTES
Name: Tahira Jenkins  Clinic Number: [id number]  Date of Treatment: 05/30/2019   Diagnosis:   Encounter Diagnosis   Name Primary?    Right leg weakness        Time in: 1520  Time Out: 1600  Total Treatment Time: 40      Subjective:    Tahira reports no significant changes.  Reports compliance with HEP, when her schedule allows. Patient presents to therapy wearing kinesiotape that was applied yesterday. Patient reports their pain to be 2.5/10 on a 0-10 scale with 0 being no pain and 10 being the worst pain imaginable.    Objective  Patient arrived to therapy appt 20' late  Tahira received therapeutic exercises to develop strength and endurance for 40 minutes including:     X 10 min NuStep (L4)    X 20 ea AirEx mini sq & HR/TR    X 20 shuttle mini sq (62#)    X 5 Sit to stand from chair with arms, with 2 airex to increase seat height    X 20 Marching with 2# in sitting    X 20 LAQ with 2# in sitting    X 20 Ball squeeze    X 20 Seated ER with GTB      Written Home Exercises Provided: Cont with HEP  Pt demo fair understanding of the education provided. Tahira demonstrated fair return demonstration of activities.     Assessment:   Patient with significant difficulty t/f sit to stand, increased time required.  When added 2 airex to increase height of seat patient remained taking increased time for sit to stand, however, became not as difficult.  Patient with increased pain with knee flex/ext/marching exercises.    Pt will continue to benefit from skilled PT intervention. Medical Necessity is demonstrated by:  Fall Risk, Continued inability to participate in vocational pursuits, Pain limits function of effected part for some activities, Requires skilled supervision to complete and progress HEP, Weakness and Edema.    Patient is making fair progress towards established goals.    Plan:  Continue with established Plan of Care towards PT goals of strengthening right hip and knee.

## 2019-05-30 NOTE — PLAN OF CARE
"TIME RECORD     05/29/2019     Start Time:  1215  Stop Time:  1300     PROCEDURES:     TIMED  Procedure Time Min.   There ex Start:1240  Stop:1300     Manual treatment Start:1215  Stop:1240       Start:  Stop:       Start:  Stop:                                                       UNTIMED  Procedure Time Min.     Start:  Stop:       Start:  Stop:        Total Timed Minutes:  45  Total Timed Units:  3  Total Untimed Units:  0  Charges Billed/# of units:  3     PHYSICAL THERAPY UPDATED PLAN OF TREATMENT     Tahira Jenkins  Onset Date:  11/01/18  SOC Date:  04/02/19  Problem List Items Addressed This Visit        Orthopedic    Right knee pain - Primary    Right leg weakness           Treatment Diagnosis:  rt knee pain, rt LE weakness  Certification Period:   04/30/19 to 06/01/19  Precautions:  none  Visits from SOC:  15  Functional Level Prior to SOC:  independent     Subjective:  "My knees feel a little better today, but I still have a hard time getting around at work."     Updated Pain Assessment:       Location: sarah. knees  Description: Aching and Dull  Activities Which Increase Pain: walking, transfers  Activities Which Decrease Pain: rest  Pain Scale: 0/10 at best, 2-3/10 now, 5/10 at worst  Impact on Function due to the Pain:  Limited ability to walk and stand up    ------------------------------------------------------------------------------------------------------------------------------     Treatment:      Tahira received therapeutic exercises to develop strength and endurance for 20 minutes including:      X 10 min NuStep (L4)   X 20 ea AirEx mini sq & HR/TR   X 20 ea up/down 6-in step = forwards/sideways   X 20 shuttle mini sq (62#)      Tahira received the following manual therapy techniques: kinesio taping applied to her sarah. knees for 25 minutes:      sarah. supra-patellar areas = lymphatic correction " technique      ----------------------------------------------------------------------------------------------------------------------------     Updated Assessment:  slow mobility during sit to stand; ROM/MMT:        Hip   Right     Left   Pain/Dysfunction with Movement     AROM PROM MMT AROM PROM MMT     Flexion   WFL   WFL  4-/5   WFL   WFL   4/5           Knee   Right     Left   Pain/Dysfunction with Movement     AROM PROM MMT AROM PROM MMT     Flexion   WFL   WFL    NT   WFL   WFL    NT    Extension   WFL   WFL  4-/5   WFL   WFL   4/5           Ankle   Right     Left   Pain/Dysfunction with Movement     AROM PROM MMT AROM PROM MMT     Plantarflexion   WFL   WFL   NT   WFL   WFL   NT    Dorsiflexion   WFL   WFL   4/5   WFL   WFL   4/5           Previous Short Term Goals Status:     1.  Radha. tx session w/out increase in symptoms (MET)  2.  Decrease c/o pain to 1/10 (NOT MET)     New Short Term Goals:     1.  Decrease patient's pain to 2/10 during performance of ADL's for independence of self care activities.  2.  Patient to tolerate at 10 reps sit to stand to improve ease of transfer.     Previous Long Term Goals Status:     1.  Improve rt hip and knee MMT 1/2 grade (NOT MET)  2.  Improve LEFS score to 70/80 (NOT MET)  3.  Demo comp w/ HEP (NOT MET)     New Long Term Goals:  1.  Patient to demo comp w/ HEP to maintain therapeutic gains.  2.  Patient to improve rt hip MMT 1/2 grade to demo strength gains from therapeutic intervention.  3.  Patient to tolerate x 10 min of NuStep at Level 5 to improve ability to ambulate safely at work.      Reasons That Services are Reasonable and Necessary to the Treatment of the Patient's Condition:   Patient continues to present with significant gait deficits and moderate difficulty with transfers.  Patient would benefit from further PT visits to continue addressing these areas and to reinforce her HEP.      -------------------------------------------------------------------------------------------------------------------------------      Certification Period: 05/29/19 to 07/06/19  Recommended Treatment Plan: 2 times per week for 5 weeks (starting wk of 06/02/19): Electrical Stimulation for pain or strengthening, Gait Training, Manual Therapy, Moist Heat/ Ice, Patient Education, Therapeutic Activites, Therapeutic Exercise, Ultrasound and HEP  Other Recommendations: Dry Needling           Therapist's Name: Agus Thurston, PT  05/30/2019     I CERTIFY THE NEED FOR THESE SERVICES FURNISHED UNDER THIS PLAN OF TREATMENT AND WHILE UNDER MY CARE     Physician's comments: ________________________________________________________________________________________________________________________________________________        Physician's Name: ___________________________________

## 2019-06-04 ENCOUNTER — CLINICAL SUPPORT (OUTPATIENT)
Dept: REHABILITATION | Facility: HOSPITAL | Age: 54
End: 2019-06-04
Attending: PHYSICIAN ASSISTANT
Payer: COMMERCIAL

## 2019-06-04 DIAGNOSIS — R29.898 RIGHT LEG WEAKNESS: ICD-10-CM

## 2019-06-04 DIAGNOSIS — M25.561 RIGHT KNEE PAIN, UNSPECIFIED CHRONICITY: Primary | ICD-10-CM

## 2019-06-04 PROCEDURE — 97110 THERAPEUTIC EXERCISES: CPT | Mod: PN

## 2019-06-04 NOTE — PROGRESS NOTES
Name: Tahira Jenkins  Clinic Number: [id number]  Date of Treatment: 06/04/2019   Diagnosis:   Encounter Diagnoses   Name Primary?    Right knee pain, unspecified chronicity Yes    Right leg weakness        Time in: 1615  Time Out: 1700  Total Treatment Time: 45  Group Time: NA      Subjective:    Tahira reports improvement of symptoms in rt knee.  Patient reports their pain to be 2/10 on a 0-10 scale with 0 being no pain and 10 being the worst pain imaginable.    Objective    Tahira received therapeutic exercises to develop strength and endurance for 45 minutes including:                  X 10 min NuStep (L5)   X 20 seated sarah. LE there ex = marching (2#), LAQ (2#), ball sq, hip ABD (GTB)              X 20 ea AirEx mini sq & HR/TR              X 20 ea up/down 6-in step = forwards/sideways              X 20 shuttle mini sq (68#)   X 20 SportsArt knee curl (22#)       Assessment:      was able to carlos. tx session w/out increase in symptoms.    Pt will continue to benefit from skilled PT intervention. Medical Necessity is demonstrated by:  Pain limits function of effected part for some activities, Unable to participate fully in daily activities and Weakness.    Patient is making fair progress towards established goals.    New/Revised goals: NA      Plan:  Continue with established Plan of Care towards PT goals.

## 2019-06-07 ENCOUNTER — CLINICAL SUPPORT (OUTPATIENT)
Dept: REHABILITATION | Facility: HOSPITAL | Age: 54
End: 2019-06-07
Attending: PHYSICIAN ASSISTANT
Payer: COMMERCIAL

## 2019-06-07 DIAGNOSIS — R29.898 RIGHT LEG WEAKNESS: ICD-10-CM

## 2019-06-07 PROCEDURE — 97110 THERAPEUTIC EXERCISES: CPT | Mod: PN

## 2019-06-07 NOTE — PROGRESS NOTES
"Name: Tahira Jenkins  [facility] Number: [id number]  Date of Treatment: 06/07/2019   Diagnosis:   Encounter Diagnosis   Name Primary?    Right leg weakness        Time in: 1605  Time Out: 1645  Total Treatment Time: 20    Subjective:    Tahira reports improvement of symptoms.  Patient reports their pain to be 2/10 on a 0-10 scale with 0 being no pain and 10 being the worst pain imaginable. States her LE edema is improving.     Objective:    Patient received individual therapy to increase strength, endurance, ROM, flexibility, posture and core stabilization with activities as follows:     Tahira received therapeutic exercises to develop strength, endurance, ROM, flexibility, posture and core stabilization for 20 minutes including:     Standing ex's 10/2:     Step up fwd 6" R     Step up lat R to 6" 10/2      Seated TE 10/2:     LAQ 2#     March 2#     Ballsqueeze    Supine TE 10/2:     SLR      DF-100 knee flexion 22# 10/3 B  Shuttle B leg press 68# 10/2 B      NuStep L5 10' LE's only    Continue HEP daily.    Pt demo good understanding of the education provided. Patient demonstrated good return demonstration of activities.     Assessment:     Improving pain per pt report. Slow gait without LOB.     Pt will continue to benefit from skilled PT intervention. Medical Necessity is demonstrated by:  Requires skilled supervision to complete and progress HEP and Weakness.    Patient is making good progress towards established goals.    Plan:    Continue with established Plan of Care towards PT goals.     "

## 2019-06-10 ENCOUNTER — CLINICAL SUPPORT (OUTPATIENT)
Dept: REHABILITATION | Facility: HOSPITAL | Age: 54
End: 2019-06-10
Attending: PHYSICIAN ASSISTANT
Payer: COMMERCIAL

## 2019-06-10 DIAGNOSIS — R29.898 RIGHT LEG WEAKNESS: ICD-10-CM

## 2019-06-10 DIAGNOSIS — M25.561 RIGHT KNEE PAIN, UNSPECIFIED CHRONICITY: ICD-10-CM

## 2019-06-10 PROCEDURE — 97110 THERAPEUTIC EXERCISES: CPT | Mod: PN

## 2019-06-10 NOTE — PROGRESS NOTES
Name: Tahira Jenkins  [facility] Number: [id number]  Date of Treatment: 06/10/2019   Diagnosis: Debility due to R knee pain  Encounter Diagnoses   Name Primary?    Right leg weakness     Right knee pain, unspecified chronicity        Time in: 1610  Time Out: 1500  Total Treatment Time: 50  Group Time: 0      Subjective:    Tahira reports decreased pain.  Patient reports their pain to be 1/10 on a 0-10 scale with 0 being no pain and 10 being the worst pain imaginable.    Objective    Patient received individual therapy to increase strength, endurance and ROM with 0 patients with activities as follows:     Tahira received therapeutic exercises to develop strength, endurance and flexibility for 50 minutes including:   Nustep L5 10'  Mat Exercises  Hamstring/heelcord stretch  SLR  SAQ's manual resist  Bridging    Assessment:     Patient appears to have difficulty initiating bridging. Noted poor ability to   Sustain hold during isometrics.  Tolerate 11#s of resistive knee flexion extension with ease.    Patient is making  progress towards established goals.    Plan:  Continue with established Plan of Care towards PT goals.

## 2019-06-13 ENCOUNTER — CLINICAL SUPPORT (OUTPATIENT)
Dept: REHABILITATION | Facility: HOSPITAL | Age: 54
End: 2019-06-13
Attending: PHYSICIAN ASSISTANT
Payer: COMMERCIAL

## 2019-06-13 DIAGNOSIS — R29.898 RIGHT LEG WEAKNESS: ICD-10-CM

## 2019-06-13 DIAGNOSIS — M25.561 RIGHT KNEE PAIN, UNSPECIFIED CHRONICITY: ICD-10-CM

## 2019-06-13 PROCEDURE — 97110 THERAPEUTIC EXERCISES: CPT | Mod: PN

## 2019-06-13 NOTE — PROGRESS NOTES
"Name: Tahira Jenkins  [facility] Number: [id number]  Date of Treatment: 06/13/2019   Diagnosis:   Encounter Diagnoses   Name Primary?    Right leg weakness     Right knee pain, unspecified chronicity        Time in: 1513  Time Out: 1605  Total Treatment Time: 53    Subjective:    Tahira reports improved pain. States she has difficulty getting her body moving in the mornings and doesn't really get going until about 11:00. Discussed with Agus, PT. .  Patient reports their pain to be 1/10 on a 0-10 scale with 0 being no pain and 10 being the worst pain imaginable.    Objective:    Patient received individual therapy to increase strength, endurance, ROM, flexibility, posture and core stabilization  with activities as follows:     Tahira received therapeutic exercises to develop strength, endurance, ROM, flexibility, posture and core stabilization for 53 minutes including:     Standing ex's 10/2:     Step up fwd 6" R     Step up lat R to 6" 10/2     Airex HR/TR     Airex minisquats     Airex SLS L/R 3/30s        Supine TE 10/2:     SLR     SAQ 2# 10/3 B      DF-100 knee flexion 22# 10/3 B  DF knee extn 11# 10/2 B  Shuttle B leg press 68# 10/2 B  Shuttle B calf press 68# 10/2      NuStep L5 10' LE's only    Continue HEP daily.    Pt demo good understanding of the education provided. Patient demonstrated good return demonstration of activities.     Assessment:     Unable to perform SLR with weight today and required tc for initiation of SLR without resistance.     Pt will continue to benefit from skilled PT intervention. Medical Necessity is demonstrated by:  Requires skilled supervision to complete and progress HEP and Weakness.    Patient is making good progress towards established goals.    Plan:    Continue with established Plan of Care towards PT goals.     "

## 2019-06-14 ENCOUNTER — CLINICAL SUPPORT (OUTPATIENT)
Dept: OBSTETRICS AND GYNECOLOGY | Facility: CLINIC | Age: 54
End: 2019-06-14
Payer: COMMERCIAL

## 2019-06-14 PROCEDURE — 96372 THER/PROPH/DIAG INJ SC/IM: CPT | Mod: S$GLB,,, | Performed by: OBSTETRICS & GYNECOLOGY

## 2019-06-14 PROCEDURE — 96372 PR INJECTION,THERAP/PROPH/DIAG2ST, IM OR SUBCUT: ICD-10-PCS | Mod: S$GLB,,, | Performed by: OBSTETRICS & GYNECOLOGY

## 2019-06-14 PROCEDURE — 99999 PR PBB SHADOW E&M-EST. PATIENT-LVL II: CPT | Mod: PBBFAC,,,

## 2019-06-14 PROCEDURE — 99999 PR PBB SHADOW E&M-EST. PATIENT-LVL II: ICD-10-PCS | Mod: PBBFAC,,,

## 2019-06-14 RX ADMIN — TESTOSTERONE CYPIONATE 50 MG: 200 INJECTION, SOLUTION INTRAMUSCULAR at 04:06

## 2019-06-19 ENCOUNTER — CLINICAL SUPPORT (OUTPATIENT)
Dept: REHABILITATION | Facility: HOSPITAL | Age: 54
End: 2019-06-19
Attending: PHYSICIAN ASSISTANT
Payer: COMMERCIAL

## 2019-06-19 DIAGNOSIS — R29.898 RIGHT LEG WEAKNESS: ICD-10-CM

## 2019-06-19 DIAGNOSIS — M25.561 RIGHT KNEE PAIN, UNSPECIFIED CHRONICITY: Primary | ICD-10-CM

## 2019-06-19 PROCEDURE — 97140 MANUAL THERAPY 1/> REGIONS: CPT | Mod: PN

## 2019-06-19 PROCEDURE — 97110 THERAPEUTIC EXERCISES: CPT | Mod: PN

## 2019-06-19 NOTE — PROGRESS NOTES
"OUTPATIENT PHYSICAL         THERAPY TREATMENT NOTE        TIME RECORD     Tahira Jenkins  06/19/2019     Start Time:  0915  Stop Time:  1000    Problem List Items Addressed This Visit        Orthopedic    Right knee pain - Primary    Right leg weakness           Subjective:  Tahira reports worsening of symptoms.  Patient reports his/her pain to be 4/10 on a 0-10 scale with 0 being no pain and 10 being the worst pain imaginable.        --------------------------------------------------------------------------------------------------------------------------    Treatment:     Tahira received therapeutic exercises to develop strength and endurance for 30 minutes including:                  X 10 min NuStep (L5)              X 20 ea AirEx mini sq & HR/TR              X 20 ea up/down 6-in step = forwards/sideways   X 20 seated sarah. LE there ex = marching (2#), LAQ (2#), ball sq, hip ABD (GTB)              X 20 shuttle mini sq and HR (62#)        Tahira received the following manual therapy techniques: kinesio taping applied to her sarah. knees for 15 minutes:                 rt knee "pitchfork" technique for patellar tendonitis      --------------------------------------------------------------------------------------------------------------------------     Assessment:  Tahira was able to tolerate treatment session w/out an increase in her symptoms.  Patient's pain was maintained at 4/10.      ---------------------------------------------------------------------------------------------------------------------------     Plan for Next Visit:  Continue with strength/mobility training to patient's tolerance.         Therapist's Name: Agus Thurston, PT  06/19/2019     "

## 2019-06-21 ENCOUNTER — CLINICAL SUPPORT (OUTPATIENT)
Dept: REHABILITATION | Facility: HOSPITAL | Age: 54
End: 2019-06-21
Attending: PHYSICIAN ASSISTANT
Payer: COMMERCIAL

## 2019-06-21 DIAGNOSIS — R29.898 RIGHT LEG WEAKNESS: ICD-10-CM

## 2019-06-21 PROCEDURE — 97110 THERAPEUTIC EXERCISES: CPT | Mod: PN

## 2019-06-21 NOTE — PROGRESS NOTES
"Name: Tahira Jenkins  [facility] Number: [id number]  Date of Treatment: 06/21/2019   Diagnosis:   Encounter Diagnosis   Name Primary?    Right leg weakness        Time in: 1615  Time Out: 1715  Total Treatment Time: 60    Subjective:    Tahira reports improvement of symptoms.  Patient reports worked today. No pain. States she thinks her buttock pain is from hormone patches, so she stopped using them on Monday. Instructed pt to discuss this with MD for safety.     Objective:    Patient received individual therapy to increase strength, endurance, ROM, flexibility, posture and core stabilization with activities as follows:     Tahira received therapeutic exercises to develop strength, endurance, ROM, flexibility, posture and core stabilization for 60 minutes including:     Standing ex's 10/2:     Step up fwd 6" R     Step up lat R to 6" 10/2     Airex HR/TR     Airex minisquats     Airex SLS L/R 3/30s    Standing gastroc stretches 3/30s B over 1/2 foam roll in // bars    Supine TE 10/2:     SLR R     SAQ 2# 10/3 B     Hip aBd/aDD R     Ballsqueeze     Clams BTB     Bridges B     DKC with swiss ball    Sit-stands x 5 with CG/SBA and vc for proper technique and weight shifting      NuStep L5 10' LE's only    Continue HEP daily.    Pt demo good understanding of the education provided. Patient demonstrated good return demonstration of activities.     Assessment:     Improving weight shifting with sit-stands with decreased hands on knees for support to complete task.     Pt will continue to benefit from skilled PT intervention. Medical Necessity is demonstrated by:  Requires skilled supervision to complete and progress HEP and Weakness.    Patient is making good progress towards established goals.    Plan:    Continue with established Plan of Care towards PT goals.     "

## 2019-06-25 ENCOUNTER — CLINICAL SUPPORT (OUTPATIENT)
Dept: REHABILITATION | Facility: HOSPITAL | Age: 54
End: 2019-06-25
Attending: PHYSICIAN ASSISTANT
Payer: COMMERCIAL

## 2019-06-25 DIAGNOSIS — M25.561 RIGHT KNEE PAIN, UNSPECIFIED CHRONICITY: ICD-10-CM

## 2019-06-25 DIAGNOSIS — R29.898 RIGHT LEG WEAKNESS: ICD-10-CM

## 2019-06-25 PROCEDURE — 97110 THERAPEUTIC EXERCISES: CPT | Mod: PN

## 2019-06-25 NOTE — PROGRESS NOTES
"Name: Tahira Jenkins  [facility] Number: [id number]  Date of Treatment: 06/25/2019   Diagnosis:   Encounter Diagnoses   Name Primary?    Right leg weakness     Right knee pain, unspecified chronicity        Time in: 1020  Time Out: 1100  Total Treatment Time: 40      Subjective:    Tahira reports "both my knees are swollen and sore today, I was very sore all over when I woke up this am."  Patient reports their pain to be 6/10 on a 0-10 scale with 0 being no pain and 10 being the worst pain imaginable.    Objective  Tahira received therapeutic exercises to develop strength, endurance, ROM and flexibility for 40 minutes including:   NuStep 10' L3 to increase hip, knee, and ankle mobility, and glute, quad, and hamstring strength  X 20 seated sarah. LE there ex = marching (2#), LAQ (2#), ball sq, hip ABD (GTB)  X 20 shuttle mini sq 56# (62# was not tolerated by patient today)     Kinesiotape still intact for todays visit on R knee    Written Home Exercises Provided: Cont with HEP daily for B LE's at home  Pt demo fair understanding of the education provided. Tahira demonstrated fair return demonstration of activities.     Assessment:   Patient with increased edema in B knees, decreased step/stride length and pace with ambulation.  Patient not progressing as well as hoped, weakness remains in R knee.    Pt will continue to benefit from skilled PT intervention. Medical Necessity is demonstrated by:  Fall Risk, Pain limits function of effected part for some activities, Unable to participate fully in daily activities, Requires skilled supervision to complete and progress HEP, Weakness and Edema.    Patient is making poor progress towards established goals.    Plan:  Continue with established Plan of Care towards PT goals.   "

## 2019-06-26 ENCOUNTER — CLINICAL SUPPORT (OUTPATIENT)
Dept: REHABILITATION | Facility: HOSPITAL | Age: 54
End: 2019-06-26
Attending: PHYSICIAN ASSISTANT
Payer: COMMERCIAL

## 2019-06-26 DIAGNOSIS — M25.561 RIGHT KNEE PAIN, UNSPECIFIED CHRONICITY: Primary | ICD-10-CM

## 2019-06-26 DIAGNOSIS — R29.898 RIGHT LEG WEAKNESS: ICD-10-CM

## 2019-06-26 PROCEDURE — 97110 THERAPEUTIC EXERCISES: CPT | Mod: PN

## 2019-06-26 NOTE — PROGRESS NOTES
"OUTPATIENT PHYSICAL THERAPY         DISCHARGE ASSESSMENT        TIME RECORD     Tahira Jenkins  06/26/2019     Start Time:  0915  Stop Time:  0955    Discharge Note Period: 04/02/19 to 06/26/19      Problem List Items Addressed This Visit        Orthopedic    Right knee pain - Primary    Right leg weakness           Current Level of Function: Patient now requires supervision during ambulation and needs ample time to complete a transfer since the exacerbation of her rt knee pain.      Physician: Andreia Burton PA-C   Original Orders : PT eval and treat  Initial visit: 04/02/19  Total Visits Received: 35  Missed Visits: 2    Subjective:  "The pain isn't so bad; it's all the swelling that gives me trouble."        --------------------------------------------------------------------------------------------------------------------------     Updated Pain Assessment:       Location: rt knee   Description: Aching and Dull  Activities Which Increase Pain: Standing and Walking  Activities Which Decrease Pain: ice and rest  Pain Scale: 0/10 at best, 1-2/10 now, 6/10 at worst    ROM/MMT:       Hip   Right     Left   Pain/Dysfunction with Movement     AROM PROM MMT AROM PROM MMT     Flexion   WFL     NT   3+/5   WFL     NT   3+/5           Knee   Right     Left   Pain/Dysfunction with Movement     AROM PROM MMT AROM PROM MMT     Flexion   WFL     NT    NT   WFL     NT    NT    Extension   WFL     NT   4-/5   WFL     NT   4-/5           Ankle   Right     Left   Pain/Dysfunction with Movement     AROM PROM MMT AROM PROM MMT     Plantarflexion   WFL    NT    NT   WFL     NT    NT    Dorsiflexion   WFL    NT   4-/5   WFL     NT   4-/5       Gait: Without AD  Analysis: slow mobility; decreased sarah. stride length    Bed Mobility:Independent  Transfers: Assistance - SBA    Special Tests: " n/a      --------------------------------------------------------------------------------------------------------------------------    Treatment:     Tahira received therapeutic exercises to develop strength and endurance for 40 minutes including:                  X 10 min NuStep (L4)              X 10 sit to stand emphasizing scoot to edge, toes under knees, leaning forward              X 20 seated sarah. LE there ex = marching (2#), LAQ (2#), ball sq, hip ABD (GTB)    Written Home Exercises Provided:  seated LE there ex;  Pt demo good    understanding of the education provided. Patient demonstrated good return    demonstration of activities.       --------------------------------------------------------------------------------------------------------------------------     Current Short Term Goals:     1.  Decrease patient's pain to 2/10 during performance of ADL's for independence of self care activities. (MET)  2.  Patient to tolerate at 10 reps sit to stand to improve ease of transfer. (MET)     Current Long Term Goals:  1.  Patient to demo comp w/ HEP to maintain therapeutic gains. (MET)  2.  Patient to improve rt hip MMT 1/2 grade to demo strength gains from therapeutic intervention. (NOT MET)  3.  Patient to tolerate x 10 min of NuStep at Level 5 to improve ability to ambulate safely at work. (NOT MET)     Changes in Status Relative to Current Goals:  Patient's pain levels have ranged between 0/10 and 6/10 (2/10 at initial assessment).  Outcome tools reveal an overall plateau in pain intensity and a moderate ability to complete her personal care.  Patient is compliant with her Home Exercise Program.       ---------------------------------------------------------------------------------------------------------------------------     Discharge reason: Patient has maximized benefit from PT at this time    Discharge plan: Continue HEP and Pt to follow-up with MD as planned    Plan:  This patient is discharged from  Physical Therapy Services.         Therapist's Name: Augs Thurston, PT  06/26/2019

## 2019-06-27 ENCOUNTER — TELEPHONE (OUTPATIENT)
Dept: INTERNAL MEDICINE | Facility: CLINIC | Age: 54
End: 2019-06-27

## 2019-06-27 DIAGNOSIS — M25.569 CHRONIC KNEE PAIN, UNSPECIFIED LATERALITY: ICD-10-CM

## 2019-06-27 DIAGNOSIS — G89.29 CHRONIC KNEE PAIN, UNSPECIFIED LATERALITY: ICD-10-CM

## 2019-06-27 NOTE — TELEPHONE ENCOUNTER
Spoke to Pt and she is requesting you check her progress/discharge note from physical therapy yesterday and is requesting refill of tramadol  msg routed to MD

## 2019-06-27 NOTE — TELEPHONE ENCOUNTER
----- Message from Cricket Hinojosa sent at 6/27/2019 12:01 PM CDT -----  Contact: Pt   Name of Who is Calling: WALLY LOCK [[id number]]    What is the request in detail:Pt is requesting a call back regarding it was her last day of therapy and that the therapist left notes for doctor to look at .....  Please contact to further discuss and advise      Can the clinic reply by [facility]: No     What [facility]  [phone]

## 2019-06-28 RX ORDER — TRAMADOL HYDROCHLORIDE 50 MG/1
50 TABLET ORAL EVERY 6 HOURS PRN
Qty: 20 TABLET | Refills: 0 | Status: SHIPPED | OUTPATIENT
Start: 2019-06-28 | End: 2020-06-27

## 2019-06-28 NOTE — TELEPHONE ENCOUNTER
Told pt refill was sent in   Pt states she is sluggish in the morning, but by afternoon she moves more.  Pt states in the afternoon I can walk, diuretics makes her move a lot to the restroom.

## 2019-07-11 ENCOUNTER — TELEPHONE (OUTPATIENT)
Dept: OBSTETRICS AND GYNECOLOGY | Facility: CLINIC | Age: 54
End: 2019-07-11

## 2019-07-11 NOTE — TELEPHONE ENCOUNTER
Spoke about PT coming in early due to weather. Pt was called into work and will reschedule. -- criss

## 2020-09-04 DIAGNOSIS — Z12.39 BREAST CANCER SCREENING: ICD-10-CM

## 2020-10-05 ENCOUNTER — PATIENT MESSAGE (OUTPATIENT)
Dept: ADMINISTRATIVE | Facility: HOSPITAL | Age: 55
End: 2020-10-05

## 2021-01-05 ENCOUNTER — PATIENT MESSAGE (OUTPATIENT)
Dept: ADMINISTRATIVE | Facility: HOSPITAL | Age: 56
End: 2021-01-05

## 2021-02-18 ENCOUNTER — PATIENT OUTREACH (OUTPATIENT)
Dept: ADMINISTRATIVE | Facility: HOSPITAL | Age: 56
End: 2021-02-18

## 2023-01-19 ENCOUNTER — OFFICE VISIT (OUTPATIENT)
Dept: INTERNAL MEDICINE | Facility: CLINIC | Age: 58
End: 2023-01-19
Payer: COMMERCIAL

## 2023-01-19 VITALS
DIASTOLIC BLOOD PRESSURE: 65 MMHG | HEIGHT: 67 IN | RESPIRATION RATE: 16 BRPM | HEART RATE: 86 BPM | BODY MASS INDEX: 33.53 KG/M2 | SYSTOLIC BLOOD PRESSURE: 135 MMHG | WEIGHT: 213.63 LBS

## 2023-01-19 DIAGNOSIS — E11.618 TYPE 2 DIABETES MELLITUS WITH OTHER DIABETIC ARTHROPATHY, WITHOUT LONG-TERM CURRENT USE OF INSULIN: ICD-10-CM

## 2023-01-19 DIAGNOSIS — Z00.00 ANNUAL PHYSICAL EXAM: Primary | ICD-10-CM

## 2023-01-19 DIAGNOSIS — Z96.641 STATUS POST TOTAL REPLACEMENT OF RIGHT HIP: ICD-10-CM

## 2023-01-19 DIAGNOSIS — M25.561 ACUTE PAIN OF RIGHT KNEE: ICD-10-CM

## 2023-01-19 PROBLEM — E11.9 TYPE 2 DIABETES MELLITUS, WITHOUT LONG-TERM CURRENT USE OF INSULIN: Status: ACTIVE | Noted: 2023-01-19

## 2023-01-19 PROCEDURE — 99999 PR PBB SHADOW E&M-EST. PATIENT-LVL IV: ICD-10-PCS | Mod: PBBFAC,,,

## 2023-01-19 PROCEDURE — 99999 PR PBB SHADOW E&M-EST. PATIENT-LVL IV: CPT | Mod: PBBFAC,,,

## 2023-01-19 PROCEDURE — 99204 PR OFFICE/OUTPT VISIT, NEW, LEVL IV, 45-59 MIN: ICD-10-PCS | Mod: S$GLB,,,

## 2023-01-19 PROCEDURE — 99204 OFFICE O/P NEW MOD 45 MIN: CPT | Mod: S$GLB,,,

## 2023-01-19 RX ORDER — METHOCARBAMOL 750 MG/1
500 TABLET, FILM COATED ORAL 4 TIMES DAILY
COMMUNITY

## 2023-01-19 RX ORDER — FAMOTIDINE 20 MG/1
20 TABLET, FILM COATED ORAL 2 TIMES DAILY
COMMUNITY

## 2023-01-19 RX ORDER — METHOTREXATE 2.5 MG/1
7 TABLET ORAL
COMMUNITY

## 2023-01-19 RX ORDER — DULOXETIN HYDROCHLORIDE 60 MG/1
60 CAPSULE, DELAYED RELEASE ORAL NIGHTLY
COMMUNITY

## 2023-01-19 RX ORDER — AMLODIPINE BESYLATE 10 MG/1
10 TABLET ORAL DAILY
COMMUNITY

## 2023-01-19 RX ORDER — TRAMADOL HYDROCHLORIDE 50 MG/1
50 TABLET ORAL 4 TIMES DAILY PRN
COMMUNITY

## 2023-01-19 NOTE — PROGRESS NOTES
Subjective     Chief Complaint: follow up after surgery, insurance issues    History of Present Illness:  Ms. Tahira Jenkins is a 57 y.o. female with HTN, RA, osteoarthritis of knees and hip s/p R YOJANA no 12/16/22 who presents to clinic for follow up. History obtained from patient and family. Patient had a total hip arthroplasty at [facility] with Dr. Ames on 12/16. Patient was discharged on 1/4/23 from [facility] with instructions to follow up with Orthopedic surgery and PT outpatient. However, patient had an insurance change to which the orthopedist and PT was not going to be covered so patient is coming in to establish care at [facility]. Patient is unsure if her new insurance will cover the orthopedist follow up which is scheduled for next week for which she will follow up with. Since her discharge, she has not been able to see PT. She was previously able to ambulate independently prior to the surgery but since the surgery, she needed a walker to ambulate. Patient state that she had a fall a couple of days ago where she fell on her knees. Patient was able to walk with walker after that but limited due to pain.    Patient will need a new PCP and will follow up with me in 1 week to establish care here.     Review of Systems   Constitutional:  Negative for chills and fever.   HENT:  Negative for sore throat.    Respiratory:  Negative for shortness of breath, wheezing and stridor.    Cardiovascular:  Negative for chest pain, palpitations and leg swelling.   Gastrointestinal:  Negative for abdominal pain, blood in stool, constipation, diarrhea, nausea and vomiting.   Genitourinary:  Negative for dysuria, flank pain, frequency and hematuria.   Musculoskeletal:  Positive for falls and joint pain. Negative for back pain and neck pain.   Neurological:  Positive for weakness. Negative for dizziness, focal weakness and headaches.   Endo/Heme/Allergies:  Does not bruise/bleed easily.   Psychiatric/Behavioral:  Negative for  memory loss. The patient does not have insomnia.      PAST HISTORY:     Past Medical History:   Diagnosis Date    Anemia        Past Surgical History:   Procedure Laterality Date    HYSTERECTOMY      VAGINAL DELIVERY      x3       Family History   Problem Relation Age of Onset    Diabetes Father     No Known Problems Mother     Hypertension Brother     Breast cancer Neg Hx     Colon cancer Neg Hx     Ovarian cancer Neg Hx        Social History     Socioeconomic History    Marital status: Single   Tobacco Use    Smoking status: Never    Smokeless tobacco: Never   Substance and Sexual Activity    Alcohol use: No     Alcohol/week: 0.0 standard drinks    Drug use: No    Sexual activity: Not Currently       MEDICATIONS & ALLERGIES:     Current Outpatient Medications on File Prior to Visit   Medication Sig    amLODIPine (NORVASC) 10 MG tablet Take 10 mg by mouth once daily.    DULoxetine (CYMBALTA) 60 MG capsule Take 60 mg by mouth every evening.    famotidine (PEPCID) 20 MG tablet Take 20 mg by mouth 2 (two) times daily.    methocarbamoL (ROBAXIN) 750 MG Tab Take 500 mg by mouth 4 (four) times daily.    methotrexate 2.5 MG Tab Take 7 tablets by mouth every 7 days.    traMADoL (ULTRAM) 50 mg tablet Take 50 mg by mouth 4 (four) times daily as needed.    b complex vitamins capsule Take 1 capsule by mouth once daily.    chlorthalidone (HYGROTEN) 25 MG Tab Take 1 tablet (25 mg total) by mouth once daily.    diclofenac sodium (VOLTAREN) 1 % Gel APPLY 2 GRAMS TOPICALLY TWICE DAILY FOR 10 DAYS    estradiol 0.05 mg/24 hr td ptsw (VIVELLE-DOT) 0.05 mg/24 hr Place 1 patch onto the skin twice a week.    ferrous gluconate (FERGON) 324 MG tablet Take 324 mg by mouth daily with breakfast.    ibuprofen (ADVIL,MOTRIN) 600 MG tablet Take 1 tablet (600 mg total) by mouth every 6 (six) hours as needed for Pain.    multivitamin capsule Take 1 capsule by mouth once daily.    oxybutynin (DITROPAN) 5 MG Tab Take 1 tablet (5 mg total) by mouth 2  "(two) times daily.     No current facility-administered medications on file prior to visit.       Review of patient's allergies indicates:   Allergen Reactions    Lisinopril Other (See Comments)     Cough      Lisinopril-hydrochlorothiazide      Cough      Vivelle [estradiol] Itching       OBJECTIVE:     Vital Signs:  Vitals:    01/19/23 1426   BP: 135/65   BP Location: Left arm   Patient Position: Sitting   BP Method: Medium (Manual)   Pulse: 86   Resp: 16   Weight: 96.9 kg (213 lb 10 oz)   Height: 5' 7" (1.702 m)       Body mass index is 33.46 kg/m².     Physical Exam:  General:  Well developed, well nourished, no acute distress. Obese  Head: Normocephalic, atraumatic  Eyes: PERRL, EOMI, clear sclera  Throat: No posterior pharyngeal erythema or exudate, no tonsillar exudate  Neck: supple, normal ROM, no thyromegaly   CVS:  RRR, S1 and S2 normal, no murmurs, rubs, gallops, 2+ peripheral pulses  Resp:  Lungs clear to auscultation, no wheezes, rales, rhonchi, cough  GI:  Abdomen soft, non-tender, non-distended, normoactive bowel sounds  MSK:  No muscle atrophy, cyanosis, peripheral edema. Mild knee effusions with significant crepitus bilaterally.   Skin:  No rashes, ulcers, erythema  Neuro:  CNII-XII grossly intact, no focal deficits noted. Antalgic gait  Psych:  Appropriate mood and affect, normal judgement    Laboratory  Lab Results   Component Value Date    WBC 5.02 04/04/2018    HGB 14.6 04/04/2018    HCT 46.3 04/04/2018    MCV 82 04/04/2018     04/04/2018       Lab Results   Component Value Date    HGBA1C >20.6 (H) 10/20/2022       Diagnostic Results:  Labs: Reviewed  X-Ray: Reviewed    ASSESSMENT & PLAN:   Ms. Tahira Jenkins is a 57 y.o. female who presents today for follow up.     Annual physical exam  -     CBC W/ AUTO DIFFERENTIAL; Future  -     COMPREHENSIVE METABOLIC PANEL; Future  -     TSH; Future  -     HIV 1/2 Ag/Ab (4th Gen); Future  -     Hepatitis C Antibody; Future  -     LIPID PANEL; " Future    Acute pain of right knee  -     Ambulatory referral/consult to Physical/Occupational Therapy; Future    Status post total replacement of right hip  -     Ambulatory referral/consult to Physical/Occupational Therapy; Future              RTC in 1 week for annual physical    Case discussed with Dr Sade Rowan MD  Internal Medicine PGY2  [facility]  [address] [place] [address] [id number] [phone]

## 2023-01-20 ENCOUNTER — LAB VISIT (OUTPATIENT)
Dept: PRIMARY CARE CLINIC | Facility: CLINIC | Age: 58
End: 2023-01-20
Payer: COMMERCIAL

## 2023-01-20 DIAGNOSIS — Z00.00 ANNUAL PHYSICAL EXAM: ICD-10-CM

## 2023-01-20 LAB
ALBUMIN SERPL BCP-MCNC: 3.8 G/DL (ref 3.5–5.2)
ALP SERPL-CCNC: 100 U/L (ref 55–135)
ALT SERPL W/O P-5'-P-CCNC: 9 U/L (ref 10–44)
ANION GAP SERPL CALC-SCNC: 10 MMOL/L (ref 8–16)
AST SERPL-CCNC: 12 U/L (ref 10–40)
BASOPHILS # BLD AUTO: 0.04 K/UL (ref 0–0.2)
BASOPHILS NFR BLD: 0.8 % (ref 0–1.9)
BILIRUB SERPL-MCNC: 0.3 MG/DL (ref 0.1–1)
BUN SERPL-MCNC: 12 MG/DL (ref 6–20)
CALCIUM SERPL-MCNC: 9.7 MG/DL (ref 8.7–10.5)
CHLORIDE SERPL-SCNC: 108 MMOL/L (ref 95–110)
CHOLEST SERPL-MCNC: 133 MG/DL (ref 120–199)
CHOLEST/HDLC SERPL: 2.1 {RATIO} (ref 2–5)
CO2 SERPL-SCNC: 25 MMOL/L (ref 23–29)
CREAT SERPL-MCNC: 0.8 MG/DL (ref 0.5–1.4)
DIFFERENTIAL METHOD: ABNORMAL
EOSINOPHIL # BLD AUTO: 0.2 K/UL (ref 0–0.5)
EOSINOPHIL NFR BLD: 4.8 % (ref 0–8)
ERYTHROCYTE [DISTWIDTH] IN BLOOD BY AUTOMATED COUNT: 16.8 % (ref 11.5–14.5)
EST. GFR  (NO RACE VARIABLE): >60 ML/MIN/1.73 M^2
GLUCOSE SERPL-MCNC: 83 MG/DL (ref 70–110)
HCT VFR BLD AUTO: 35.5 % (ref 37–48.5)
HCV AB SERPL QL IA: NORMAL
HDLC SERPL-MCNC: 63 MG/DL (ref 40–75)
HDLC SERPL: 47.4 % (ref 20–50)
HGB BLD-MCNC: 10.4 G/DL (ref 12–16)
HIV 1+2 AB+HIV1 P24 AG SERPL QL IA: NORMAL
IMM GRANULOCYTES # BLD AUTO: 0 K/UL (ref 0–0.04)
IMM GRANULOCYTES NFR BLD AUTO: 0 % (ref 0–0.5)
LDLC SERPL CALC-MCNC: 56.2 MG/DL (ref 63–159)
LYMPHOCYTES # BLD AUTO: 1.2 K/UL (ref 1–4.8)
LYMPHOCYTES NFR BLD: 25.1 % (ref 18–48)
MCH RBC QN AUTO: 24.4 PG (ref 27–31)
MCHC RBC AUTO-ENTMCNC: 29.3 G/DL (ref 32–36)
MCV RBC AUTO: 83 FL (ref 82–98)
MONOCYTES # BLD AUTO: 0.5 K/UL (ref 0.3–1)
MONOCYTES NFR BLD: 9.8 % (ref 4–15)
NEUTROPHILS # BLD AUTO: 2.9 K/UL (ref 1.8–7.7)
NEUTROPHILS NFR BLD: 59.5 % (ref 38–73)
NONHDLC SERPL-MCNC: 70 MG/DL
NRBC BLD-RTO: 0 /100 WBC
PLATELET # BLD AUTO: 266 K/UL (ref 150–450)
PMV BLD AUTO: 10.5 FL (ref 9.2–12.9)
POTASSIUM SERPL-SCNC: 3.9 MMOL/L (ref 3.5–5.1)
PROT SERPL-MCNC: 6.9 G/DL (ref 6–8.4)
RBC # BLD AUTO: 4.27 M/UL (ref 4–5.4)
SODIUM SERPL-SCNC: 143 MMOL/L (ref 136–145)
TRIGL SERPL-MCNC: 69 MG/DL (ref 30–150)
TSH SERPL DL<=0.005 MIU/L-ACNC: 1.85 UIU/ML (ref 0.4–4)
WBC # BLD AUTO: 4.79 K/UL (ref 3.9–12.7)

## 2023-01-20 PROCEDURE — 86803 HEPATITIS C AB TEST: CPT

## 2023-01-20 PROCEDURE — 36415 COLL VENOUS BLD VENIPUNCTURE: CPT

## 2023-01-20 PROCEDURE — 80061 LIPID PANEL: CPT

## 2023-01-20 PROCEDURE — 85025 COMPLETE CBC W/AUTO DIFF WBC: CPT

## 2023-01-20 PROCEDURE — 84443 ASSAY THYROID STIM HORMONE: CPT

## 2023-01-20 PROCEDURE — 87389 HIV-1 AG W/HIV-1&-2 AB AG IA: CPT

## 2023-01-20 PROCEDURE — 80053 COMPREHEN METABOLIC PANEL: CPT

## 2023-01-23 NOTE — PROGRESS NOTES
I have reviewed the notes, assessments, and/or procedures performed by the resident, I concur with her/his documentation of Thaira Jenkins.

## 2023-01-26 ENCOUNTER — OFFICE VISIT (OUTPATIENT)
Dept: INTERNAL MEDICINE | Facility: CLINIC | Age: 58
End: 2023-01-26
Payer: COMMERCIAL

## 2023-01-26 VITALS
BODY MASS INDEX: 33.71 KG/M2 | SYSTOLIC BLOOD PRESSURE: 124 MMHG | HEART RATE: 96 BPM | HEIGHT: 67 IN | DIASTOLIC BLOOD PRESSURE: 69 MMHG | OXYGEN SATURATION: 99 % | WEIGHT: 214.75 LBS

## 2023-01-26 DIAGNOSIS — Z00.00 ANNUAL PHYSICAL EXAM: Primary | ICD-10-CM

## 2023-01-26 DIAGNOSIS — D50.0 IRON DEFICIENCY ANEMIA DUE TO CHRONIC BLOOD LOSS: ICD-10-CM

## 2023-01-26 PROCEDURE — 99999 PR PBB SHADOW E&M-EST. PATIENT-LVL IV: ICD-10-PCS | Mod: PBBFAC,,,

## 2023-01-26 PROCEDURE — 99396 PR PREVENTIVE VISIT,EST,40-64: ICD-10-PCS | Mod: S$GLB,,,

## 2023-01-26 PROCEDURE — 99396 PREV VISIT EST AGE 40-64: CPT | Mod: S$GLB,,,

## 2023-01-26 PROCEDURE — 99999 PR PBB SHADOW E&M-EST. PATIENT-LVL IV: CPT | Mod: PBBFAC,,,

## 2023-01-26 NOTE — PROGRESS NOTES
Subjective     Chief Complaint:    History of Present Illness:  Ms. Tahira Jenkins is a 57 y.o. female with RA and osteoarthritis of the hip who presents for her annual physical. Patient was seen last week and obtained her labs. She is currently working with PT at [facility] along with follow ups with her orthopedist given her recent hip replacement and has had no issues. Patient feels that she is getting stronger. She has no other complaints at this time.     Review of Systems   Constitutional:  Negative for chills and fever.   HENT:  Negative for sore throat.    Respiratory:  Negative for shortness of breath, wheezing and stridor.    Cardiovascular:  Negative for chest pain, palpitations and leg swelling.   Gastrointestinal:  Negative for abdominal pain, blood in stool, constipation, diarrhea, nausea and vomiting.   Genitourinary:  Negative for dysuria, flank pain, frequency and hematuria.   Musculoskeletal:  Negative for back pain, joint pain and neck pain.   Neurological:  Positive for weakness. Negative for dizziness, focal weakness and headaches.   Endo/Heme/Allergies:  Does not bruise/bleed easily.   Psychiatric/Behavioral:  Negative for memory loss. The patient does not have insomnia.      PAST HISTORY:     Past Medical History:   Diagnosis Date    Anemia        Past Surgical History:   Procedure Laterality Date    HYSTERECTOMY      VAGINAL DELIVERY      x3       Family History   Problem Relation Age of Onset    Diabetes Father     No Known Problems Mother     Hypertension Brother     Breast cancer Neg Hx     Colon cancer Neg Hx     Ovarian cancer Neg Hx        Social History     Socioeconomic History    Marital status: Single   Tobacco Use    Smoking status: Never    Smokeless tobacco: Never   Substance and Sexual Activity    Alcohol use: No     Alcohol/week: 0.0 standard drinks    Drug use: No    Sexual activity: Not Currently       MEDICATIONS & ALLERGIES:     Current Outpatient Medications on File Prior  "to Visit   Medication Sig    amLODIPine (NORVASC) 10 MG tablet Take 10 mg by mouth once daily.    b complex vitamins capsule Take 1 capsule by mouth once daily.    chlorthalidone (HYGROTEN) 25 MG Tab Take 1 tablet (25 mg total) by mouth once daily.    diclofenac sodium (VOLTAREN) 1 % Gel APPLY 2 GRAMS TOPICALLY TWICE DAILY FOR 10 DAYS    DULoxetine (CYMBALTA) 60 MG capsule Take 60 mg by mouth every evening.    estradiol 0.05 mg/24 hr td ptsw (VIVELLE-DOT) 0.05 mg/24 hr Place 1 patch onto the skin twice a week.    famotidine (PEPCID) 20 MG tablet Take 20 mg by mouth 2 (two) times daily.    ferrous gluconate (FERGON) 324 MG tablet Take 324 mg by mouth daily with breakfast.    ibuprofen (ADVIL,MOTRIN) 600 MG tablet Take 1 tablet (600 mg total) by mouth every 6 (six) hours as needed for Pain.    methocarbamoL (ROBAXIN) 750 MG Tab Take 500 mg by mouth 4 (four) times daily.    methotrexate 2.5 MG Tab Take 7 tablets by mouth every 7 days.    multivitamin capsule Take 1 capsule by mouth once daily.    oxybutynin (DITROPAN) 5 MG Tab Take 1 tablet (5 mg total) by mouth 2 (two) times daily.    traMADoL (ULTRAM) 50 mg tablet Take 50 mg by mouth 4 (four) times daily as needed.     No current facility-administered medications on file prior to visit.       Review of patient's allergies indicates:   Allergen Reactions    Lisinopril Other (See Comments)     Cough      Lisinopril-hydrochlorothiazide      Cough      Vivelle [estradiol] Itching       OBJECTIVE:     Vital Signs:  Vitals:    01/26/23 1516   BP: 124/69   BP Location: Left arm   Patient Position: Sitting   BP Method: Large (Automatic)   Pulse: 96   SpO2: 99%   Weight: 97.4 kg (214 lb 11.7 oz)   Height: 5' 7" (1.702 m)       Body mass index is 33.63 kg/m².     Physical Exam:  General:  Well developed, well nourished, no acute distress. obese  Head: Normocephalic, atraumatic  Eyes: PERRL, EOMI, clear sclera  Throat: No posterior pharyngeal erythema or exudate, no tonsillar " exudate  Neck: supple, normal ROM, no thyromegaly   CVS:  RRR, S1 and S2 normal, no murmurs, rubs, gallops, 2+ peripheral pulses  Resp:  Lungs clear to auscultation, no wheezes, rales, rhonchi, cough  GI:  Abdomen soft, non-tender, non-distended, normoactive bowel sounds  MSK:  No muscle atrophy, cyanosis, peripheral edema   Skin:  No rashes, ulcers, erythema  Neuro:  CNII-XII grossly intact, no focal deficits noted  Psych:  Appropriate mood and affect, normal judgement    Laboratory  Lab Results   Component Value Date    WBC 4.79 01/20/2023    HGB 10.4 (L) 01/20/2023    HCT 35.5 (L) 01/20/2023    MCV 83 01/20/2023     01/20/2023     @[id number](GLU,NA,K,Cl,CO2,BUN,Creatinine,Calcium,MG)@  No results found for: INR, PROTIME  Lab Results   Component Value Date    HGBA1C >20.6 (H) 10/20/2022     No results for input(s): POCTGLUCOSE in the last 72 hours.    Diagnostic Results:  Labs: Reviewed    ASSESSMENT & PLAN:   Ms. Tahira Jenkins is a 57 y.o. female who presents today for follow up for her annual physical.   Annual physical exam  Labs reviewed with patient. Patient has iron deficiency anemia for which she is taking iron supplements. Patient will have follow ups with her orthopedist and rheumatologist and I will see her after her rheumatology appointment in April.    Other orders  -     Cancel: (In Office Administered) Pneumococcal Conjugate Vaccine (20 Valent) (IM)                RTC in 3 months    Case discussed with Dr Mariely Rowan MD  Internal Medicine PGY2  [facility]  [address] [place] [address] [id number] [phone]